# Patient Record
Sex: MALE | Race: WHITE | Employment: UNEMPLOYED | URBAN - NONMETROPOLITAN AREA
[De-identification: names, ages, dates, MRNs, and addresses within clinical notes are randomized per-mention and may not be internally consistent; named-entity substitution may affect disease eponyms.]

---

## 2022-01-07 ENCOUNTER — HOSPITAL ENCOUNTER (INPATIENT)
Age: 37
LOS: 5 days | Discharge: HOME | DRG: 885 | End: 2022-01-12
Attending: EMERGENCY MEDICINE | Admitting: PSYCHIATRY & NEUROLOGY
Payer: MEDICAID

## 2022-01-07 DIAGNOSIS — R45.851 SUICIDAL IDEATION: Primary | ICD-10-CM

## 2022-01-07 DIAGNOSIS — F20.9 SCHIZOPHRENIA, UNSPECIFIED TYPE (HCC): ICD-10-CM

## 2022-01-07 PROBLEM — F29 PSYCHOSIS (HCC): Status: ACTIVE | Noted: 2022-01-07

## 2022-01-07 PROBLEM — F32.A DEPRESSION WITH SUICIDAL IDEATION: Status: ACTIVE | Noted: 2022-01-07

## 2022-01-07 LAB
ALBUMIN SERPL-MCNC: 5 G/DL (ref 3.5–5.2)
ALP BLD-CCNC: 56 U/L (ref 40–130)
ALT SERPL-CCNC: 6 U/L (ref 5–41)
AMPHETAMINE SCREEN, URINE: NEGATIVE
ANION GAP SERPL CALCULATED.3IONS-SCNC: 10 MMOL/L (ref 7–19)
AST SERPL-CCNC: 10 U/L (ref 5–40)
BARBITURATE SCREEN URINE: NEGATIVE
BASOPHILS ABSOLUTE: 0 K/UL (ref 0–0.2)
BASOPHILS RELATIVE PERCENT: 0.4 % (ref 0–1)
BENZODIAZEPINE SCREEN, URINE: NEGATIVE
BILIRUB SERPL-MCNC: <0.2 MG/DL (ref 0.2–1.2)
BUN BLDV-MCNC: 7 MG/DL (ref 6–20)
CALCIUM SERPL-MCNC: 9.6 MG/DL (ref 8.6–10)
CANNABINOID SCREEN URINE: NEGATIVE
CHLORIDE BLD-SCNC: 102 MMOL/L (ref 98–111)
CO2: 26 MMOL/L (ref 22–29)
COCAINE METABOLITE SCREEN URINE: NEGATIVE
CREAT SERPL-MCNC: 0.9 MG/DL (ref 0.5–1.2)
EOSINOPHILS ABSOLUTE: 0.2 K/UL (ref 0–0.6)
EOSINOPHILS RELATIVE PERCENT: 1.5 % (ref 0–5)
ETHANOL: <10 MG/DL (ref 0–0.08)
GFR AFRICAN AMERICAN: >59
GFR NON-AFRICAN AMERICAN: >60
GLUCOSE BLD-MCNC: 98 MG/DL (ref 74–109)
HCT VFR BLD CALC: 44 % (ref 42–52)
HEMOGLOBIN: 14.3 G/DL (ref 14–18)
IMMATURE GRANULOCYTES #: 0 K/UL
LYMPHOCYTES ABSOLUTE: 2.8 K/UL (ref 1.1–4.5)
LYMPHOCYTES RELATIVE PERCENT: 27.6 % (ref 20–40)
Lab: NORMAL
MCH RBC QN AUTO: 29.6 PG (ref 27–31)
MCHC RBC AUTO-ENTMCNC: 32.5 G/DL (ref 33–37)
MCV RBC AUTO: 91.1 FL (ref 80–94)
MONOCYTES ABSOLUTE: 0.5 K/UL (ref 0–0.9)
MONOCYTES RELATIVE PERCENT: 5.1 % (ref 0–10)
NEUTROPHILS ABSOLUTE: 6.5 K/UL (ref 1.5–7.5)
NEUTROPHILS RELATIVE PERCENT: 65.2 % (ref 50–65)
OPIATE SCREEN URINE: NEGATIVE
PDW BLD-RTO: 11.9 % (ref 11.5–14.5)
PLATELET # BLD: 302 K/UL (ref 130–400)
PMV BLD AUTO: 10.1 FL (ref 9.4–12.4)
POTASSIUM SERPL-SCNC: 3.7 MMOL/L (ref 3.5–5)
RBC # BLD: 4.83 M/UL (ref 4.7–6.1)
SARS-COV-2, NAAT: NOT DETECTED
SODIUM BLD-SCNC: 138 MMOL/L (ref 136–145)
TOTAL PROTEIN: 6.6 G/DL (ref 6.6–8.7)
WBC # BLD: 10 K/UL (ref 4.8–10.8)

## 2022-01-07 PROCEDURE — 85025 COMPLETE CBC W/AUTO DIFF WBC: CPT

## 2022-01-07 PROCEDURE — 99285 EMERGENCY DEPT VISIT HI MDM: CPT

## 2022-01-07 PROCEDURE — 6370000000 HC RX 637 (ALT 250 FOR IP): Performed by: PSYCHIATRY & NEUROLOGY

## 2022-01-07 PROCEDURE — 80307 DRUG TEST PRSMV CHEM ANLYZR: CPT

## 2022-01-07 PROCEDURE — 96372 THER/PROPH/DIAG INJ SC/IM: CPT

## 2022-01-07 PROCEDURE — 82077 ASSAY SPEC XCP UR&BREATH IA: CPT

## 2022-01-07 PROCEDURE — 6360000002 HC RX W HCPCS: Performed by: EMERGENCY MEDICINE

## 2022-01-07 PROCEDURE — 80053 COMPREHEN METABOLIC PANEL: CPT

## 2022-01-07 PROCEDURE — 99223 1ST HOSP IP/OBS HIGH 75: CPT | Performed by: PSYCHIATRY & NEUROLOGY

## 2022-01-07 PROCEDURE — 87635 SARS-COV-2 COVID-19 AMP PRB: CPT

## 2022-01-07 PROCEDURE — 1240000000 HC EMOTIONAL WELLNESS R&B

## 2022-01-07 PROCEDURE — 36415 COLL VENOUS BLD VENIPUNCTURE: CPT

## 2022-01-07 RX ORDER — NICOTINE 21 MG/24HR
1 PATCH, TRANSDERMAL 24 HOURS TRANSDERMAL DAILY
Status: DISCONTINUED | OUTPATIENT
Start: 2022-01-07 | End: 2022-01-12 | Stop reason: HOSPADM

## 2022-01-07 RX ORDER — ZIPRASIDONE MESYLATE 20 MG/ML
20 INJECTION, POWDER, LYOPHILIZED, FOR SOLUTION INTRAMUSCULAR ONCE
Status: COMPLETED | OUTPATIENT
Start: 2022-01-07 | End: 2022-01-07

## 2022-01-07 RX ORDER — HYDROXYZINE HYDROCHLORIDE 25 MG/1
25 TABLET, FILM COATED ORAL 3 TIMES DAILY PRN
Status: DISCONTINUED | OUTPATIENT
Start: 2022-01-07 | End: 2022-01-12 | Stop reason: HOSPADM

## 2022-01-07 RX ORDER — TRAZODONE HYDROCHLORIDE 50 MG/1
50 TABLET ORAL NIGHTLY
Status: DISCONTINUED | OUTPATIENT
Start: 2022-01-07 | End: 2022-01-12 | Stop reason: HOSPADM

## 2022-01-07 RX ORDER — RISPERIDONE 1 MG/1
1 TABLET, FILM COATED ORAL NIGHTLY
Status: DISCONTINUED | OUTPATIENT
Start: 2022-01-07 | End: 2022-01-08

## 2022-01-07 RX ADMIN — ZIPRASIDONE MESYLATE 20 MG: 20 INJECTION, POWDER, LYOPHILIZED, FOR SOLUTION INTRAMUSCULAR at 02:34

## 2022-01-07 RX ADMIN — HYDROXYZINE HYDROCHLORIDE 25 MG: 25 TABLET ORAL at 14:22

## 2022-01-07 RX ADMIN — TRAZODONE HYDROCHLORIDE 50 MG: 50 TABLET ORAL at 20:28

## 2022-01-07 RX ADMIN — HYDROXYZINE HYDROCHLORIDE 25 MG: 25 TABLET ORAL at 20:28

## 2022-01-07 RX ADMIN — RISPERIDONE 1 MG: 1 TABLET ORAL at 20:28

## 2022-01-07 ASSESSMENT — SLEEP AND FATIGUE QUESTIONNAIRES
DO YOU USE A SLEEP AID: NO
DIFFICULTY FALLING ASLEEP: YES
AVERAGE NUMBER OF SLEEP HOURS: 2
DO YOU HAVE DIFFICULTY SLEEPING: YES
RESTFUL SLEEP: NO
DIFFICULTY STAYING ASLEEP: YES
DIFFICULTY ARISING: NO
SLEEP PATTERN: INSOMNIA

## 2022-01-07 ASSESSMENT — LIFESTYLE VARIABLES: HISTORY_ALCOHOL_USE: NO

## 2022-01-07 ASSESSMENT — ENCOUNTER SYMPTOMS
ABDOMINAL PAIN: 0
SHORTNESS OF BREATH: 0

## 2022-01-07 ASSESSMENT — PATIENT HEALTH QUESTIONNAIRE - PHQ9: SUM OF ALL RESPONSES TO PHQ QUESTIONS 1-9: 21

## 2022-01-07 NOTE — ED NOTES
Bed: 05  Expected date:   Expected time:   Means of arrival:   Comments:  EMS     Sindy Adam RN  01/07/22 0021

## 2022-01-07 NOTE — PROGRESS NOTES
Requirement Note     SW met with pt to complete Psychosocial and CSSR-S on this date. Patients long and short term goals discussed. Patient voiced understanding. Treatment plan sheet signed. Patient verbalized understanding of the treatment plan. Patient participated in goals and objectives of the treatment plan. Patient completed safety plan with , patient received copy of plan, and original was placed into patient's chart. SW explained treatment goals with pt. Decreasing depression and anxiety by improvement of positive coping patterns was discussed. Pt acknowledged understanding of treatment goals and signed treatment plan signature sheet. In the last 6 months has the pt been a danger to self: YES  In the last 6 months has the pt been a danger to others: NO  Legal Guardian/POA: NO     Provided patient with [employer] handout entitled \"Quitting Smoking. \"  Reviewed handout with patient: addressing dangers of smoking, developing coping skills, and providing basic information about quitting. Patient received all components practical counseling of tobacco practical counseling during the hospital stay.

## 2022-01-07 NOTE — H&P
Department of Psychiatry  Attending History and Physical        CHIEF COMPLAINT:  \"Hearing voices\"    History obtained from: patient, chart    HISTORY OF PRESENT ILLNESS:    29 y.o. white male from a care home house who presented to the emergency department for evaluation of depression and anxiety. He has been having  thoughts about harming himself. UDS negative. Patient seen resting in bed this morning. He is calm and cooperative. He reports suicidal ideation. Reports hearing voices for a number of years - \"not sure what they are saying, they come and go\". States he was diagnosed with schizophrenia several years ago. He was taking Risperdal which was helping and ran out about 4 months ago. States he did not have money for the medication. He was released from half-way about 6 months ago and has been staying in a care home house. States recently he has been working and saving money. He has no social support. He stopped communicating with his relatives a while ago. He is thinking about reaching out to his sister. He denies mood swings and racing thoughts. He denies decreased need for sleep. He denies paranoia. States he was somewhat paranoid when she was at home. He is open to medication adjustment. PSYCHIATRIC HISTORY:    Diagnoses: Schizophrenia ? Suicide attempts/gestures: Denies   Prior hospitalizations: about 1 yr ago   Medication trials: Risperdal, Celexa  Mental health contact: Lost to follow-up   Head trauma: Denies    SUBSTANCE USE HISTORY:  In the past abused alcohol and meth. No recent substance use. Smokes cigarettes. Past Medical History:    Past Medical History:   Diagnosis Date    Schizophrenia [facility])        Past Surgical History:    Past Surgical History:   Procedure Laterality Date    HAND SURGERY Left        Medications Prior to Admission:   Prior to Admission medications    Not on File       Allergies:  Patient has no known allergies. Social History:  Lives in a care home house.  Released from care home 6 mo ago. Family History:   No history of psychiatric illness or suicide attempts. REVIEW OF SYSTEMS:  General: No fevers, chills, night sweats, no recent weight loss or gain. Head: No headache, no migraine. Eyes: No recent visual changes. Ears: No recent hearing changes. Nose: No increased congestion or change in sense of smell. Throat: No sore throat, no trouble swallowing. Cardiovascular: No chest pain or palpitations, or dizziness. Respiratory: No cough, wheezes, congestion, or shortness of breath. Gastrointestinal: No abdominal pain, nausea or vomiting, no diarrhea or constipation. Musculo-skeletal: No edema, deformities, or loss of functions. Neurological: No loss of consciousness, abnormal sensations, or weakness. Skin: No rash, abrasions or bruises. PHYSICAL EXAM:  GENERAL APPEARANCE: 29y.o. year-old male in NAD   HEAD: Normocephalic, atraumatic. THROAT: No erythema, exudates, lesions. No tongue laceration. CARDIOVASCULAR: PMI nondisplaced. Regular rhythm and rate. Normal S1 and S2.  PULMONARY: Clear to auscultation bilaterally, no tenderness to palpation. ABDOMEN: Soft, nontender, nondistended. MUSCULOSKELTAL: No obvious deformities, clubbing, cyanosis or edema, no spinous process or paraspinous tenderness, normal ROM, distal pulses intact symmetric 2+ bilaterally. NEUROLOGICAL: Alert, oriented x 3, CN II-XII grossly intact, motor strength 5/5 all muscle groups, DTR 2+ intact and symmetric, sensation intact to sharp and dull. No abnormal movements or tremors. SKIN: Warm, dry, intact, no rash, abrasions bruises     Vitals:  /78   Pulse 100   Temp 97.2 °F (36.2 °C) (Temporal)   Resp 17   Ht 5' 5\" (1.651 m)   Wt 126 lb 9.6 oz (57.4 kg)   SpO2 96%   BMI 21.07 kg/m²     Mental Status Examination:    Appearance: Stated age. Gait stable. No abnormal movements or tremor. Behavior: Calm, cooperative. Speech: Normal in tone, volume, and quality.  No slurring, dysarthria or pressured speech noted. Mood: \"Low \"   Affect: Mood congruent. Thought Process: Appears linear. Thought Content: Endorses SI. Denies HI. Mild paranoia. Perceptions: Denies auditory or visual hallucinations at present time. Not responding to internal stimuli. Concentration: Intact. Orientation: to person, place, date, and situation. Language: Intact. Fund of information: Intact. Memory: Recent and remote appear intact. Neurovegitative: Poor appetite and sleep. Insight: Impaired. Judgment: Impaired. DATA:  Lab Results   Component Value Date    WBC 10.0 01/07/2022    HGB 14.3 01/07/2022    HCT 44.0 01/07/2022    MCV 91.1 01/07/2022     01/07/2022     Lab Results   Component Value Date     01/07/2022    K 3.7 01/07/2022     01/07/2022    CO2 26 01/07/2022    BUN 7 01/07/2022    CREATININE 0.9 01/07/2022    GLUCOSE 98 01/07/2022    CALCIUM 9.6 01/07/2022    PROT 6.6 01/07/2022    LABALBU 5.0 01/07/2022    BILITOT <0.2 01/07/2022    ALKPHOS 56 01/07/2022    AST 10 01/07/2022    ALT 6 01/07/2022    LABGLOM >60 01/07/2022    GFRAA >59 01/07/2022           ASSESSMENT AND PLAN:  DSM-5 DIAGNOSIS:   Impression:  Psychosis unspecified  R/o Schizoaffective disorder  Suicidal ideation  Tobacco use disorder    Patient endorses suicidal ideation and is meeting the criteria for inpatient psychiatric treatment. Plan:   -Admit to HI Unit and monitor on 15 minute checks. Suicide precautions.  Jason Owusu reviewed. -Gather collateral information from family with release.  -Medical monitoring to be performed by Dr. Jason Nielson and associates. Order routine labs. -Acclimate to the unit. Provide supportive psychotherapy.  -Encourage participation in groups and therapeutic activities as appropriate. Work on coping skills. -Medications:    Restart Risperdal for psychosis. Trazodone for sleep.  Discussed benefits, alternatives and risks involved with Trazodone, including - but not limited to - possible adverse effects of dizziness, hypotension, increased risk of falls w/ need to slowly transition between positions, excessive drowsiness, dry mouth, constipation or allergic reaction were discussed with the patient. Also discussed increased risk of priapism which is a painful, prolonged erection which constitutes a medical emergency for which the patient would need to notify provider while in the hospital or go to the nearest emergency room for treatment. Further discussed possibility of Serotonin Syndrome (sx including diaphoresis, agitation, muscle tension increase/rigidity, fever) with use of other serotonergic agents. Advised caution in operating vehicles/machinery after taking trazodone if continued as an outpatient.    Offer nicotine patch to help with nicotine withdrawal.    -The risks, benefits, side effects, indications, contraindications, and adverse effects of the medications have been discussed.  -The patient has verbalized understanding and has capacity to give informed consent.  -SW help evaluate home environment and provide outpatient resources.  -Discuss with treatment team.

## 2022-01-07 NOTE — PROGRESS NOTES
Group Therapy Note    Start Time: 900  End Time:  930  Number of Participants: 9    Type of Group: Community Meeting       Patient's Goal:        Notes:  Patient didn't attend    Participation Level:  Active Listener       Participation Quality: Appropriate      Thought Process/Content: Logical      Affective Functioning: Congruent      Mood: calm      Level of consciousness:  Alert      Modes of Intervention: Support      Discipline Responsible: Behavioral Health Tech II      Signature:  Alba Dhaliwal

## 2022-01-07 NOTE — ED NOTES
[facility] called informed that pt was going to be admitted     [id number]  01/07/22 2604

## 2022-01-07 NOTE — PLAN OF CARE
Group Therapy Note     Date: 1/7/2022  Start Time: 1100  End Time:  5465  Number of Participants: 6     Type of Group: Psychoeducation     Wellness Binder Information  Module Name:  Staying well  Session Number:  1     Patient's Goal:  daily maintenance and coping skills     Notes:  pt was verbally prompted to attend group. Pt refused. Information about staying well was provided. Status After Intervention:       Participation Level:      Participation Quality:         Speech:           Thought Process/Content:         Affective Functioning:         Mood:         Level of consciousness:          Response to Learning:         Endings:      Modes of Intervention:         Discipline Responsible: Psychoeducational Specialist        Signature:  Deirdre Agrawal

## 2022-01-07 NOTE — PLAN OF CARE
Problem: Discharge Planning:  Goal: Discharged to appropriate level of care  Description: Discharged to appropriate level of care  1/7/2022 1409 by Jennifer Fisher RN  Outcome: Ongoing  1/7/2022 1409 by Jennifer Fisher RN  Outcome: Ongoing     Problem: Health Maintenance - Impaired:  Goal: Ability to perform activities of daily living will improve  Description: Ability to perform activities of daily living will improve  1/7/2022 1409 by Jennifer Fisher RN  Outcome: Ongoing  1/7/2022 1409 by Jennifer Fisher RN  Outcome: Ongoing  Goal: Able to sleep without medication for appropriate length of time  Description: Able to sleep without medication for appropriate length of time  1/7/2022 1409 by Jennifer Fisher RN  Outcome: Ongoing  1/7/2022 1409 by Jennifer Fisher RN  Outcome: Ongoing  Goal: Maintenance of adequate nutrition will improve  Description: Maintenance of adequate nutrition will improve  1/7/2022 1409 by Jennifer Fisher RN  Outcome: Ongoing  1/7/2022 1409 by Jennifer Fisher RN  Outcome: Ongoing     Problem: Mood - Altered:  Goal: Mood stable  Description: Mood stable  1/7/2022 1409 by Jennifer Fisher RN  Outcome: Ongoing  1/7/2022 1409 by Jennifer Fisher RN  Outcome: Ongoing     Problem: Self-Esteem - Low:  Goal: Demonstrates positive self-esteem  Description: Demonstrates positive self-esteem  1/7/2022 1409 by Jennifer Fisher RN  Outcome: Ongoing  1/7/2022 1409 by Jennifer Fisher RN  Outcome: Ongoing     Problem: Cerebrospinal Fluid Leakage - Risk Of:  Goal: Demonstration of organized thought processes  Description: Demonstration of organized thought processes  1/7/2022 1409 by Jennifer Fisher RN  Outcome: Ongoing  1/7/2022 1409 by Jennifer Fisher RN  Outcome: Ongoing     Problem: Violence - Risk of, Self/Other-Directed:  Goal: Knowledge of developmental care interventions  Description: Absence of violence  1/7/2022 1409 by Jennifer Fisher RN  Outcome: Ongoing  1/7/2022 1409 by Francine Goldstein Willam RN  Outcome: Ongoing

## 2022-01-07 NOTE — ED NOTES
Pt changed into paper scrubs. Belongings removed from pt's room. Belongings checked by security. Sitter at bedside.       Rhonda French RN  01/07/22 7805

## 2022-01-07 NOTE — ED PROVIDER NOTES
[facility]  eMERGENCY dEPARTMENT eNCOUnter      Pt Name: Moraima Desai  MRN: [id number]  [place] 1985  Date of evaluation: 1/7/2022  Provider: Karla Mulligan MD    CHIEF COMPLAINT       Chief Complaint   Patient presents with    Insomnia     has not slept in 2 weeks. SI x2 weeks. no plan. depression/anxiety.  Suicidal         HISTORY OF PRESENT ILLNESS   (Location/Symptom, Timing/Onset,Context/Setting, Quality, Duration, Modifying Factors, Severity)  Note limiting factors. Moraima Desai is a 29 y.o. male who presents to the emergency department for evaluation regarding feelings of depression and anxiety. Patient states that he has a prior history of schizophrenia and feels like his thoughts are racing. He states he has been having some thoughts about wanting to harm himself. Patient states that he has a prior history of inpatient hospitalization most recently was about a year ago. HPI    NursingNotes were reviewed. REVIEW OF SYSTEMS    (2-9 systems for level 4, 10 or more for level 5)     Review of Systems   Constitutional: Negative for chills and fever. Respiratory: Negative for shortness of breath. Cardiovascular: Negative for chest pain. Gastrointestinal: Negative for abdominal pain. Neurological: Negative for syncope. Psychiatric/Behavioral: Positive for hallucinations, sleep disturbance and suicidal ideas. The patient is nervous/anxious. All other systems reviewed and are negative. PAST MEDICALHISTORY     Past Medical History:   Diagnosis Date    Schizophrenia [facility])          SURGICAL HISTORY       Past Surgical History:   Procedure Laterality Date    HAND SURGERY Left          CURRENT MEDICATIONS     Previous Medications    No medications on file       ALLERGIES     Patient has no known allergies. FAMILY HISTORY     History reviewed. No pertinent family history.        SOCIAL HISTORY       Social History     Socioeconomic History    Marital status: Legally      Spouse name: None    Number of children: None    Years of education: None    Highest education level: None   Occupational History    None   Tobacco Use    Smoking status: Current Every Day Smoker    Smokeless tobacco: Never Used   Substance and Sexual Activity    Alcohol use: Not Currently    Drug use: Not Currently     Types: Methamphetamines (Crystal Meth), Cocaine, Marijuana (Blenda De Luna)    Sexual activity: None   Other Topics Concern    None   Social History Narrative    None     Social Determinants of Health     Financial Resource Strain:     Difficulty of Paying Living Expenses: Not on file   Food Insecurity:     Worried About Running Out of Food in the Last Year: Not on file    Nirmala of Food in the Last Year: Not on file   Transportation Needs:     Lack of Transportation (Medical): Not on file    Lack of Transportation (Non-Medical):  Not on file   Physical Activity:     Days of Exercise per Week: Not on file    Minutes of Exercise per Session: Not on file   Stress:     Feeling of Stress : Not on file   Social Connections:     Frequency of Communication with Friends and Family: Not on file    Frequency of Social Gatherings with Friends and Family: Not on file    Attends Hinduism Services: Not on file    Active Member of [address] [place] [address] [employer] or Organizations: Not on file    Attends Club or Organization Meetings: Not on file    Marital Status: Not on file   Intimate Partner Violence:     Fear of Current or Ex-Partner: Not on file    Emotionally Abused: Not on file    Physically Abused: Not on file    Sexually Abused: Not on file   Housing Stability:     Unable to Pay for Housing in the Last Year: Not on file    Number of [place] in the Last Year: Not on file    Unstable Housing in the Last Year: Not on file       SCREENINGS             PHYSICAL EXAM    (up to 7 for level 4, 8 or more for level 5)     ED Triage Vitals [01/07/22 0024]   BP Temp Temp src Pulse Resp SpO2 Height Weight   139/86 98.8 °F (37.1 °C) -- 98 20 99 % 5' 5\" (1.651 m) 134 lb (60.8 kg)       Physical Exam  Vitals and nursing note reviewed. HENT:      Head: Atraumatic. Mouth/Throat:      Mouth: Mucous membranes are not dry. Eyes:      General: No scleral icterus. Pupils: Pupils are equal, round, and reactive to light. Neck:      Trachea: No tracheal deviation. Cardiovascular:      Rate and Rhythm: Normal rate and regular rhythm. Heart sounds: Normal heart sounds. No murmur heard. Pulmonary:      Effort: Pulmonary effort is normal. No respiratory distress. Breath sounds: Normal breath sounds. No stridor. Abdominal:      General: There is no distension. Palpations: Abdomen is soft. Tenderness: There is no abdominal tenderness. There is no guarding. Skin:     Capillary Refill: Capillary refill takes less than 2 seconds. Coloration: Skin is not pale. Findings: No rash. Neurological:      Mental Status: He is alert and oriented to person, place, and time. Psychiatric:         Mood and Affect: Mood is anxious. Behavior: Behavior is not agitated. Behavior is cooperative. Thought Content: Thought content includes suicidal ideation. DIAGNOSTIC RESULTS       LABS:  Labs Reviewed   CBC WITH AUTO DIFFERENTIAL - Abnormal; Notable for the following components:       Result Value    MCHC 32.5 (*)     Neutrophils % 65.2 (*)     All other components within normal limits   COVID-19, RAPID   COMPREHENSIVE METABOLIC PANEL   URINE DRUG SCREEN   ETHANOL       All other labs were within normal range or not returned as of this dictation. EMERGENCY DEPARTMENT COURSE and DIFFERENTIAL DIAGNOSIS/MDM:   Vitals:    Vitals:    01/07/22 0024   BP: 139/86   Pulse: 98   Resp: 20   Temp: 98.8 °F (37.1 °C)   SpO2: 99%   Weight: 134 lb (60.8 kg)   Height: 5' 5\" (1.651 m)       MDM    Reassessment    Patient is medically cleared to undergo psychiatric evaluation. There is no behavioral health intake team on duty for assessment this evening. We will speak directly with the attending psychiatrist and review this case. CONSULTS:    Case was discussed with Dr. Humphrey Crigler, attending psychiatrist who is agreeable to accept patient for inpatient admission to the adult BHI unit. PROCEDURES:  Unless otherwise noted below, none     Procedures    FINAL IMPRESSION      1. Suicidal ideation    2.  Schizophrenia, unspecified type [facility])          DISPOSITION/PLAN   DISPOSITION admitted to behavioral health unit      (Please note that portions of this note were completed with a voice recognition program.  Efforts were made to edit thedictations but occasionally words are mis-transcribed.)    Rosetta Jamison MD (electronically signed)  Attending Emergency Physician         Rosetta Jamison MD  01/07/22 5847

## 2022-01-07 NOTE — PROGRESS NOTES
Behavioral Services  Medicare Certification Upon Admission    I certify that this patient's inpatient psychiatric hospital admission is medically necessary for:    [x] (1) Treatment which could reasonably be expected to improve this patient's condition,       [] (2) Or for diagnostic study;     AND     [x](2) The inpatient psychiatric services are provided while the individual is under the care of a physician and are included in the individualized plan of care.     Estimated length of stay/service 3-5 days  Plan for post-hospital care TBA    Electronically signed by Angella Joshua MD on 1/7/2022 at 1:37 PM

## 2022-01-07 NOTE — ED NOTES
Called report to [facility]. She is going to call me back when the pt gets a bed assignment.       Ammy Son, RN  01/07/22 3076

## 2022-01-07 NOTE — PROGRESS NOTES
BHI Daily Shift Assessment  Nursing Progress Note    Room: 0608/608-01 Name: Eyad Elder Age: 29 y.o. Gender: male   Dx: <principal problem not specified>  Precautions: suicide risk  Target Symptoms:   Accu-Chek: NoSleep: Yes,Sleep Quality Good SI No [facility] auditory [facility]al [facility] [employer]  ADLs: Yes Speech: normal Depression: 6 Anxiety: 10   Participation LevelActive Listener and Interactive  Appetite: Good  Respiratory symptoms: No Headache: No Body aches: No Fever: No Cough: No  Patients encouraged to wear masks, wash hands frequently and practice social distancing while on the unit: Yes  Visitation: No   Participation QualityAppropriate and Attentive    Complaints:    Notes:  Patient rested in his room after being medicated down in the ER. Patient woke up and ate his lunch and took a shower. Talked with patient during this time. Patient said that because of the medicine in the ED he got really good sleep and claimed to have gotten at least 8 hours. Patient said that his depression rated as a 6 and his anxiety rated at a 9 or a 10. Patient asked for some anxiety medicine at this time. Patient denied SI and HI but said that he is constantly having auditory hallucinations that do give him commands. In the past he has gotten commands to harm himself or others, but today the commands consisted of \"Put your left shoe on first.\" and \"Hold your hand this way when you throw away trash. \" Will continue to monitor patient for safety.      Signature:

## 2022-01-07 NOTE — PROGRESS NOTES
Admission Note      Reason for admission/Target Symptom: Patient admitted to [facility] due to male who presents to the emergency department for evaluation regarding feelings of depression and anxiety. Patient states that he has a prior history of schizophrenia and feels like his thoughts are racing. He states he has been having some thoughts about wanting to harm himself. Patient states that he has a prior history of inpatient hospitalization most recently was about a year ago.     Diagnoses: Depression NOS  UDS: Neg  BAL:  Neg    SW met with treatment team to discuss patient's treatment including care planning, discharge planning, and follow-up needs. Pt has been admitted to [facility]. Treatment team has identified patient's discharge needs as medication management and outpatient therapy/counseling. Pt confirmed  the need for ongoing treatment post inpatient stay. Pt was also provided a handout of contact information for drug and alcohol treatment centers and other community support service such as [facility], AA, and [facility].

## 2022-01-07 NOTE — FLOWSHEET NOTE
01/07/22 1543   Encounter Summary   Services provided to: Patient   Referral/Consult From: Physician  (Consult:  Emotional distress)   Complexity of Encounter High   Length of Encounter 1 hour   Spiritual/Tenriism   Type Spiritual struggle   Assessment Tearful;Fearful   Intervention Active listening;Discussed belief system/Protestant practices/magui;Empowerment   Outcome Venting emotion; De-escalated   S:  Patient stated \"Am scared; that I can be that person capable,\" of doing harm. Mentioned family without elaborating. O:  Patient appeared tearful, paused long silence before talking; appeared to be in deep thought before saying. A:  Patient seems able to articulate for the first time his \"reality\" and the sense of being responsible for the bad things in the world; heightened sense of guilt and helplessness; towards the end, seems able to sense that he is not alone in his condition; a relief to put some words to his Kiki Saldana" thoughts. Seems to appreciate his decision to come to the facility & owning he would benefit more in following the program.    P:  Spiritual Care: attentive listening; challenging.     Electronically signed by TREVOR Roque. [employer] on 1/7/2022 at 3:57 PM  P:

## 2022-01-07 NOTE — PROGRESS NOTES
[facility] Admission from ED  Nursing Admission Note        Reason for Admission: Brian Romero is a 29 y.o. male who presents to the emergency department for evaluation regarding feelings of depression and anxiety. Patient states that he has a prior history of schizophrenia and feels like his thoughts are racing. He states he has been having some thoughts about wanting to harm himself. Patient states that he has a prior history of inpatient hospitalization most recently was about a year ago    Patient Active Problem List   Diagnosis    Depression with suicidal ideation         Addictive Behavior:   Addictive Behavior  In the past 3 months, have you felt or has someone told you that you have a problem with:  : None  Do you have a history of Chemical Use?: No  Do you have a history of Alcohol Use?: No  Do you have a history of Street Drug Abuse?: No  Histroy of Prescripton Drug Abuse?: No    Medical Problems:   Past Medical History:   Diagnosis Date    Schizophrenia ([facility] [address]        Status EXAM:  Status and Exam  Normal: No  Facial Expression: Sad,Flat,Worried  Affect: Congruent  Level of Consciousness: Alert  Mood:Normal: No  Mood: Depressed,Anxious,Sad,Helpless  Motor Activity:Normal: No  Motor Activity: Decreased  Interview Behavior: Cooperative  Preception: [place] to Person,[place] to Time,[place] to Place,[place] to Situation  Attention:Normal: No  Attention: Unable to Concentrate,Distractible  Thought Processes: Circumstantial,Blocking  Thought Content:Normal: No  Thought Content: Preoccupations  Hallucinations:  Auditory (Comment) (Patient stated the voices tell him to kill himself.)  Delusions: No  Memory:Normal: No  Memory: Poor Recent,Poor Remote  Insight and Judgment: No  Insight and Judgment: Poor Judgment,Poor Insight  Present Suicidal Ideation: No  Present Homicidal Ideation: No      Metabolic Screening:    No results found for: LABA1C  No results found for: CHOL  No results found for: TRIG  No results found for: HDL  No components found for: LDLCAL  No results found for: LABVLDL    Body mass index is 21.07 kg/m². BP Readings from Last 2 Encounters:   01/07/22 130/78       PATIENT STRENGTHS:  Strengths: Communication    Patient Strengths and Limitations:  Limitations: Lacks leisure interests,Tendency to isolate self,Hopeless about future      Tobacco Screening:  Practical Counseling, on admission, krish X, if applicable and completed (first 3 are required if patient doesn't refuse):            Recognizing danger situations (included triggers and roadblocks)   yes              Coping skills (new ways to manage stress, exercise, relaxation techniques, changing routine, distraction  yes                                                   Basic information about quitting (benefits of quitting, techniques in how to quit, available resources yes  Referral for counseling faxed to [facility]                                     Patient refused counseling yes  Patient has not smoked in the last 30 days no  Patient offered nicotine patch. No Received  Refused n/a  Patient is a non-smoker no         Admission to Unit:    Pt admitted to [facility] under the care of Dr. Thao Kincaid,  arrived on unit via [facility] [address] with security and staff from ED   Patient arrived dressed in paper scrubs:  yes. Body assessment and safety check completed by [facility] and [facility] and  no contraband discovered. Patient belongings and valuables was cataloged and accounted for by [facility]. Admission completed by Sendy Enciso to unit, unit policy and expectations:  yes    Reviewed and explained all legal documents:  yes    Education for Fall Prevention and Restraints given: yes    Patient signed all legal documents yes   Pt verbalizes understanding:yes     Jennifer Best Obtained? yes    Identifies stressors.yes   .     COVID TEACHING: Nursing provided education regarding COVID for social distancing, wearing masks, washing hands, and reporting any symptoms:

## 2022-01-08 LAB
CHOLESTEROL, TOTAL: 153 MG/DL (ref 160–199)
HBA1C MFR BLD: 5.2 % (ref 4–6)
HDLC SERPL-MCNC: 46 MG/DL (ref 55–121)
LDL CHOLESTEROL CALCULATED: 89 MG/DL
TRIGL SERPL-MCNC: 91 MG/DL (ref 0–149)
TSH REFLEX FT4: 0.58 UIU/ML (ref 0.35–5.5)
VITAMIN B-12: 168 PG/ML (ref 211–946)
VITAMIN D 25-HYDROXY: 18.8 NG/ML

## 2022-01-08 PROCEDURE — 99233 SBSQ HOSP IP/OBS HIGH 50: CPT | Performed by: PSYCHIATRY & NEUROLOGY

## 2022-01-08 PROCEDURE — 84443 ASSAY THYROID STIM HORMONE: CPT

## 2022-01-08 PROCEDURE — 80061 LIPID PANEL: CPT

## 2022-01-08 PROCEDURE — 82306 VITAMIN D 25 HYDROXY: CPT

## 2022-01-08 PROCEDURE — 1240000000 HC EMOTIONAL WELLNESS R&B

## 2022-01-08 PROCEDURE — 6360000002 HC RX W HCPCS: Performed by: FAMILY MEDICINE

## 2022-01-08 PROCEDURE — 82607 VITAMIN B-12: CPT

## 2022-01-08 PROCEDURE — 83036 HEMOGLOBIN GLYCOSYLATED A1C: CPT

## 2022-01-08 PROCEDURE — 6370000000 HC RX 637 (ALT 250 FOR IP): Performed by: PSYCHIATRY & NEUROLOGY

## 2022-01-08 PROCEDURE — 36415 COLL VENOUS BLD VENIPUNCTURE: CPT

## 2022-01-08 PROCEDURE — 6370000000 HC RX 637 (ALT 250 FOR IP): Performed by: FAMILY MEDICINE

## 2022-01-08 RX ORDER — RISPERIDONE 1 MG/1
2 TABLET, FILM COATED ORAL NIGHTLY
Status: DISCONTINUED | OUTPATIENT
Start: 2022-01-08 | End: 2022-01-12 | Stop reason: HOSPADM

## 2022-01-08 RX ORDER — CYANOCOBALAMIN 1000 UG/ML
1000 INJECTION INTRAMUSCULAR; SUBCUTANEOUS WEEKLY
Status: DISCONTINUED | OUTPATIENT
Start: 2022-01-21 | End: 2022-01-12 | Stop reason: HOSPADM

## 2022-01-08 RX ORDER — CYANOCOBALAMIN 1000 UG/ML
1000 INJECTION INTRAMUSCULAR; SUBCUTANEOUS DAILY
Status: DISCONTINUED | OUTPATIENT
Start: 2022-01-08 | End: 2022-01-12 | Stop reason: HOSPADM

## 2022-01-08 RX ORDER — ERGOCALCIFEROL 1.25 MG/1
50000 CAPSULE ORAL WEEKLY
Status: DISCONTINUED | OUTPATIENT
Start: 2022-01-08 | End: 2022-01-12 | Stop reason: HOSPADM

## 2022-01-08 RX ORDER — CYANOCOBALAMIN 1000 UG/ML
1000 INJECTION INTRAMUSCULAR; SUBCUTANEOUS
Status: DISCONTINUED | OUTPATIENT
Start: 2022-03-11 | End: 2022-01-12 | Stop reason: HOSPADM

## 2022-01-08 RX ADMIN — HYDROXYZINE HYDROCHLORIDE 25 MG: 25 TABLET ORAL at 15:51

## 2022-01-08 RX ADMIN — TRAZODONE HYDROCHLORIDE 50 MG: 50 TABLET ORAL at 20:56

## 2022-01-08 RX ADMIN — ERGOCALCIFEROL 50000 UNITS: 1.25 CAPSULE ORAL at 14:17

## 2022-01-08 RX ADMIN — RISPERIDONE 2 MG: 1 TABLET ORAL at 20:56

## 2022-01-08 RX ADMIN — CYANOCOBALAMIN 1000 MCG: 1000 INJECTION, SOLUTION INTRAMUSCULAR at 14:18

## 2022-01-08 NOTE — PROGRESS NOTES
Group Therapy Note    Start Time: 800  End Time:  474  Number of Participants: 10    Type of Group: Community Meeting       Patient's Goal:  \"My thoughts\"      Notes:        Participation Level:  Active Listener       Participation Quality: Appropriate      Thought Process/Content: Logical      Affective Functioning: Congruent      Mood: Calm      Level of consciousness:  Alert      Modes of Intervention: Support      Discipline Responsible: Behavioral Health Tech II      Signature:  Danae Menjivar

## 2022-01-08 NOTE — H&P
HISTORY and PHYSICAL      CHIEF COMPLAINT:  Psychosis    Reason for Admission:  Psychosis    History Obtained From:  Patient, chart    HISTORY OF PRESENT ILLNESS:      The patient is a 29 y.o. male who is admitted to the [place] [address] unit with worsening mood issues. He has no new medical issues. He has no c/o CP or SOA. No abdominal pain or N/V. No dysuria. No new pain complaints. No HA or dizziness. Past Medical History:        Diagnosis Date    Schizophrenia [facility])      Past Surgical History:        Procedure Laterality Date    HAND SURGERY Left          Medications Prior to Admission:    No medications prior to admission. Allergies:  Patient has no known allergies. Social History:   TOBACCO:   reports that he has been smoking. He has never used smokeless tobacco.  ETOH:   reports previous alcohol use. DRUGS:   reports previous drug use. Drugs: Methamphetamines (Crystal Meth), Cocaine, and Marijuana (Joanna Crumbly). MARITAL STATUS: single  OCCUPATION:  He is working  Patient currently lives in a residential house      Family History:   History reviewed. No pertinent family history. REVIEW OF SYSTEMS:  Constitutional: neg  CV: neg  Pulmonary: neg  GI: neg  : neg  Psych: psychosis  Neuro: neg  Skin: neg  MusculoSkeletal: neg  HEENT: neg  Joints: neg    Vitals:  /85   Pulse 85   Temp 97.5 °F (36.4 °C)   Resp 16   Ht 5' 5\" (1.651 m)   Wt 126 lb 9.6 oz (57.4 kg)   SpO2 99%   BMI 21.07 kg/m²     PHYSICAL EXAM:  Gen: NAD, alert  HEENT: WNL  Lymph: no LAD  Neck: no JVD or masses  Chest: CTA bilat  CV: RRR  Abdomen: NT/ND  Extrem: no C/C/E  Neuro: non focal  Skin: no rashes  Joints: no redness    DATA:  I have reviewed the admission labs and imaging tests.     ASSESSMENT AND PLAN:      Active Problems:    Psychosis---follow with Bryant Rangel MD  8:58 PM 1/7/2022

## 2022-01-08 NOTE — PROGRESS NOTES
Group Note    Number of Participants in Group: 8  Number of Patients on Unit:11      Patient attended group:Yes  Reason for Absence:  Intervention for patient absence:        Type of Group:   Wrap-Up/Relaxation    Patient's Goal: See wrap up group sheet    Participation Level:     Active participant           Patient Response to Learning: Yes    Patient's Behavior: Withdrawn    Is Patient Social/Interacting: Yes/ minimal    Relaxation:   Television:No   Reading:No   Game/Puzzle:No   Phone: No       Notes/Comments:      Please see patient's wrap up group sheet for patient's comments       Electronically signed by Terence Chowdhury on 1/8/22 at 3:02 AM CST

## 2022-01-08 NOTE — PLAN OF CARE
Problem: Discharge Planning:  Goal: Discharged to appropriate level of care  1/8/2022 0935 by Ivana Maria RN  Outcome: Ongoing  1/8/2022 0006 by Maximiliano Looney RN  Outcome: Ongoing     Problem: Health Maintenance - Impaired:  Goal: Ability to perform activities of daily living will improve  1/8/2022 0935 by Ivana Maria RN  Outcome: Ongoing  1/8/2022 0006 by Maximiliano Looney RN  Outcome: Ongoing  Goal: Able to sleep without medication for appropriate length of time  1/8/2022 0935 by Ivana Maria RN  Outcome: Ongoing  1/8/2022 0006 by Maximiliano Looney RN  Outcome: Ongoing  Goal: Maintenance of adequate nutrition will improve  1/8/2022 0935 by Ivana Maria RN  Outcome: Ongoing  1/8/2022 0006 by Maximiliano Looney RN  Outcome: Ongoing     Problem: Mood - Altered:  Goal: Mood stable  1/8/2022 0935 by Ivana Maria RN  Outcome: Ongoing  1/8/2022 0006 by Maximiliano Looney RN  Outcome: Ongoing     Problem: Self-Esteem - Low:  Goal: Demonstrates positive self-esteem  1/8/2022 0935 by Ivana Maria RN  Outcome: Ongoing  1/8/2022 0006 by Maximiliano Looney RN  Outcome: Ongoing     Problem: Cerebrospinal Fluid Leakage - Risk Of:  Goal: Demonstration of organized thought processes  1/8/2022 0935 by Ivana Maria RN  Outcome: Ongoing  1/8/2022 0006 by Maximiliano Looney RN  Outcome: Ongoing     Problem: Violence - Risk of, Self/Other-Directed:  Goal: Knowledge of developmental care interventions  1/8/2022 0935 by Ivana Maria RN  Outcome: Ongoing  1/8/2022 0006 by Maximiliano Looney RN  Outcome: Ongoing

## 2022-01-08 NOTE — PLAN OF CARE
Problem: Discharge Planning:  Goal: Discharged to appropriate level of care  Description: Discharged to appropriate level of care  1/8/2022 0006 by Raúl Trevizo RN  Outcome: Ongoing  1/7/2022 1409 by William Sweet RN  Outcome: Ongoing     Problem: Health Maintenance - Impaired:  Goal: Ability to perform activities of daily living will improve  Description: Ability to perform activities of daily living will improve  1/8/2022 0006 by Raúl Trevizo RN  Outcome: Ongoing  1/7/2022 1409 by William Sweet RN  Outcome: Ongoing  Goal: Able to sleep without medication for appropriate length of time  Description: Able to sleep without medication for appropriate length of time  1/8/2022 0006 by Raúl Trevizo RN  Outcome: Ongoing  1/7/2022 1409 by William Sweet RN  Outcome: Ongoing  Goal: Maintenance of adequate nutrition will improve  Description: Maintenance of adequate nutrition will improve  1/8/2022 0006 by Raúl Trevizo RN  Outcome: Ongoing  1/7/2022 1409 by William Sweet RN  Outcome: Ongoing     Problem: Mood - Altered:  Goal: Mood stable  Description: Mood stable  1/8/2022 0006 by Raúl Trevizo RN  Outcome: Ongoing  1/7/2022 1409 by William Sweet RN  Outcome: Ongoing     Problem: Self-Esteem - Low:  Goal: Demonstrates positive self-esteem  Description: Demonstrates positive self-esteem  1/8/2022 0006 by Raúl Trevizo RN  Outcome: Ongoing  1/7/2022 1409 by William Sweet RN  Outcome: Ongoing     Problem: Violence - Risk of, Self/Other-Directed:  Goal: Knowledge of developmental care interventions  Description: Absence of violence  1/8/2022 0006 by Raúl Trevizo RN  Outcome: Ongoing  1/7/2022 1409 by William Sweet RN  Outcome: Ongoing

## 2022-01-08 NOTE — PROGRESS NOTES
BHI Daily Shift Assessment  Nursing Progress Note    Room: 06/608-01 Name: Shilpa Aaron Age: 29 y.o. Gender: male   Dx: <principal problem not specified>  Precautions: suicide risk  Target Symptoms:   Accu-Chek: NoSleep: Yes,Sleep Quality Fair SI No AVH auditory [facility]  ADLs: No Speech: normal Depression: 7 Anxiety: 8   Participation LevelMinimal  Appetite: Fair  Respiratory symptoms: No Headache: No Body aches: No Fever: No Cough: No  Patients encouraged to wear masks, wash hands frequently and practice social distancing while on the unit: Yes  Visitation: No   Participation QualityResistant    Complaints:none    Notes: Patient is alert and oriented x 4. Pleasant, calm and cooperative. Sitting in day area isolated to self, wearing hospital attire, appearance is clean and appropriate. Well groomed. Withdrawn and guarded. Affect flat, thought processes are linear. Not social and did not attend morning group. Appetite and sleep is fair.      Signature: Electronically signed by Marek Pena RN on 1/8/22 at 10:28 AM CST

## 2022-01-08 NOTE — PROGRESS NOTES
[facility] Adult Unit Daily Assessment  Nursing Progress Note    Room: [facility]/608-01   Name: Debra Liz   Age: 29 y.o. Gender: male   Dx: <principal problem not specified>  Precautions: suicide risk  Inpatient Status: voluntary       SLEEP:    Sleep Quality Good  Sleep Medications: Yes   PRN Sleep Meds: No       MEDICAL:    Other PRN Meds: Yes   Med Compliant: Yes  Accu-Chek: No  Oxygen/CPAP/BiPAP: No  CIWA/CINA: No   PAIN Assessment: none  Side Effects from medication: No    Is Patient experiencing any respiratory symptoms (headache, fever, body aches, cough. Vonne Dach ): no  Patient educated by nursing to practice social distancing, wear masks, wash hands frequently: yes      PSYCH:    Depression: 6   Anxiety: 7   SI denies suicidal ideation   HI Negative for homicidal ideation      AVH:Present -  Command Hallucinations-non threatening      GENERAL:    Appetite: no change from normal    Social: Yes, minimal   Speech: hesitant   Appearance: appropriately dressed and healthy looking    GROUP:    Group Participation: Yes  Participation Quality: Active Listener    Notes:     Alert, oriented, pleasant and cooperative. Sitting quietly alone at table. Reports depression 6/10 and anxiety 7/10, denies SI or HI but does endorse AVH. Reports the commands are related to \"normal day to day activities. \" Denies any threatening commands. Compliant with medications, social at times, participates in groups, and cooperative with staff.      Electronically signed by Jose Luis Christianson RN on 1/8/22 at 2:55 AM CST

## 2022-01-08 NOTE — PROGRESS NOTES
Department of Psychiatry  Attending Progress Note     Chief complaint: \"I'm doing so-so\"    SUBJECTIVE:   Chart reviewed, discussed with the team.  Patient remains withdrawn. Sleeping and eating well. Med compliant. Patient seen in the dining area today. He is calm and cooperative. States he slept somewhat better. No significant improvement in his mood. Reports intermittent auditory hallucinations. Denies suicidal ideation. Asking about his medications. No complaints. OBJECTIVE    Physical  Wt Readings from Last 3 Encounters:   01/07/22 126 lb 9.6 oz (57.4 kg)     Temp Readings from Last 3 Encounters:   01/08/22 97.5 °F (36.4 °C) (Temporal)     BP Readings from Last 3 Encounters:   01/08/22 112/71     Pulse Readings from Last 3 Encounters:   01/08/22 91        Review of Systems: 14-point review of systems negative except as described above    Mental Status Examination:   Appearance:  Stated age. Microcephaly. Gait stable. No abnormal movements or tremor. Behavior: Calm, coperative  Speech: Normal in tone, volume, and quality. No slurring, dysarthria or pressured speech noted. Mood: \"So-so \"   Affect: Constricted  Thought Process: Appears linear. Thought Content: Denies SI/HI . No overt delusions or paranoia appreciated. Perceptions: Intermittent auditory hallucinations. Dennies visual hallucinations at present time. Not responding to internal stimuli. Concentration: Intact. Orientation: to person, place, date, and situation. Language: Intact. Fund of information: Intact. Memory: Recent and remote appear intact. Neurovegitative: Improved appetite and sleep. Insight: Improving. Judgment: Improving.     Data  Lab Results   Component Value Date    WBC 10.0 01/07/2022    HGB 14.3 01/07/2022    HCT 44.0 01/07/2022    MCV 91.1 01/07/2022     01/07/2022      Lab Results   Component Value Date     01/07/2022    K 3.7 01/07/2022     01/07/2022    CO2 26 01/07/2022 BUN 7 01/07/2022    CREATININE 0.9 01/07/2022    GLUCOSE 98 01/07/2022    CALCIUM 9.6 01/07/2022    PROT 6.6 01/07/2022    LABALBU 5.0 01/07/2022    BILITOT <0.2 01/07/2022    ALKPHOS 56 01/07/2022    AST 10 01/07/2022    ALT 6 01/07/2022    LABGLOM >60 01/07/2022    GFRAA >59 01/07/2022       Medications    Current Facility-Administered Medications:     vitamin D (ERGOCALCIFEROL) capsule 50,000 Units, 50,000 Units, Oral, Weekly, Artemio Macias MD    cyanocobalamin injection 1,000 mcg, 1,000 mcg, IntraMUSCular, Daily **FOLLOWED BY** [START ON 1/21/2022] cyanocobalamin injection 1,000 mcg, 1,000 mcg, IntraMUSCular, Weekly **FOLLOWED BY** [START ON 3/11/2022] cyanocobalamin injection 1,000 mcg, 1,000 mcg, IntraMUSCular, Q30 Days, Artemio Macias MD    nicotine (NICODERM CQ) 21 MG/24HR 1 patch, 1 patch, TransDERmal, Daily, Felipe Shanks MD, 1 patch at 01/08/22 1001    risperiDONE (RISPERDAL) tablet 1 mg, 1 mg, Oral, Nightly, Shaun Griffiths MD, 1 mg at 01/07/22 2028    traZODone (DESYREL) tablet 50 mg, 50 mg, Oral, Nightly, Shaun Griffiths MD, 50 mg at 01/07/22 2028    hydrOXYzine (ATARAX) tablet 25 mg, 25 mg, Oral, TID PRN, Shaun Griffiths MD, 25 mg at 01/07/22 2028    ASSESSMENT AND PLAN  DSM 5 DIAGNOSIS  Impression  Psychosis unspecified  R/o Schizoaffective disorder  Tobacco use disorder  Vit B12 deficiency, severe  Vit D deficiency    Continue to observe. Med adjustment. Plan:   1. Psychiatric Medications:   Increase Risperdal to help with psychosis. Monitor for side effects. The risks, benefits, side effects, indications, contraindications, alternatives and adverse effects of the medications have been discussed with patient. 2. Continue to provide supportive psychotherapy. Encourage socialization and participation in recreational activities. Work on coping skills. 3. Medical Issues:    Continue medical monitoring by Dr. Jerrell Hamilton and associates.       4. Disposition:     to provide outpatient resources and facilitate disposition.      Amount of time spent with patient:      35 minutes with greater than 50 % of the time spent in counseling and collaboration of care

## 2022-01-09 PROCEDURE — 6370000000 HC RX 637 (ALT 250 FOR IP): Performed by: PSYCHIATRY & NEUROLOGY

## 2022-01-09 PROCEDURE — 1240000000 HC EMOTIONAL WELLNESS R&B

## 2022-01-09 PROCEDURE — 6360000002 HC RX W HCPCS: Performed by: FAMILY MEDICINE

## 2022-01-09 RX ADMIN — TRAZODONE HYDROCHLORIDE 50 MG: 50 TABLET ORAL at 20:31

## 2022-01-09 RX ADMIN — RISPERIDONE 2 MG: 1 TABLET ORAL at 20:31

## 2022-01-09 RX ADMIN — CYANOCOBALAMIN 1000 MCG: 1000 INJECTION, SOLUTION INTRAMUSCULAR at 08:14

## 2022-01-09 NOTE — PROGRESS NOTES
Progress Note  Donnie Begum  1/9/2022 12:23 PM  Subjective:   Admit Date:   1/7/2022      CC/ADMIT DX:       Interval History:   Reviewed overnight events and nursing notes. He denies any physical complaints. I have reviewed all labs/diagnostics from the last 24hrs. ROS:   I have done a 10 point ROS and all are negative, except what is mentioned in the HPI. ADULT DIET; Regular    Medications:      vitamin D  50,000 Units Oral Weekly    cyanocobalamin  1,000 mcg IntraMUSCular Daily    Followed by   Stacie Vick ON 1/21/2022] cyanocobalamin  1,000 mcg IntraMUSCular Weekly    Followed by   Stacie Vick ON 3/11/2022] cyanocobalamin  1,000 mcg IntraMUSCular Q30 Days    risperiDONE  2 mg Oral Nightly    nicotine  1 patch TransDERmal Daily    traZODone  50 mg Oral Nightly           Objective:   Vitals: /71   Pulse 105   Temp 98.1 °F (36.7 °C) (Temporal)   Resp 16   Ht 5' 5\" (1.651 m)   Wt 126 lb 9.6 oz (57.4 kg)   SpO2 98%   BMI 21.07 kg/m²  No intake or output data in the 24 hours ending 01/09/22 1223  General appearance: alert and cooperative with exam  Extremities: extremities normal, atraumatic, no cyanosis or edema  Neurologic:  No obvious focal neurologic deficits. Skin: no rashes    Assessment and Plan: Active Problems:    Depression with suicidal ideation    Psychosis ([facility] [address]  Resolved Problems:    * No resolved hospital problems. *    Vit D and B12 Def    Elevated BP    Plan:  1. Continue present medication(s)   2. Follow with BP  3. Follow with Psych      Discharge planning:   home     Reviewed treatment plans with the patient and/or family.              Electronically signed by Rosa Scott MD on 1/9/2022 at 12:23 PM

## 2022-01-09 NOTE — PROGRESS NOTES
BHI Daily Shift Assessment  Nursing Progress Note     Room: 06/608-01 Name: Sammie Alamo Age: 29 y.o. Gender: male   Dx: <principal problem not specified>  Precautions: suicide risk  Target Symptoms:   Accu-Chek: NoSleep: Yes,Sleep Quality Fair SI No AVH auditory [address] [place] [address]  ADLs: No Speech: normal Depression: 5 Anxiety: 7  Participation LevelMinimal  Appetite: Fair  Respiratory symptoms: No Headache: No Body aches: No Fever: No Cough: No  Patients encouraged to wear masks, wash hands frequently and practice social distancing while on the unit: Yes  Visitation: No   Participation QualityResistant     Complaints:none     Notes: Patient is alert and oriented x 4. Pleasant, calm and cooperative. Sitting in day area isolated to self, mood is sad and worried. Wearing hospital attire, appearance is clean and appropriate. Well groomed. Withdrawn and guarded. Affect flat, thought processes are linear. Not social but  attended morning group. Appetite and sleep is fair. Present for auditory hallucinations. Says he is hearing multiple voices telling him \"everything\" but they are improving.      Electronically signed by Shivam Guevara RN on 1/9/22 at 9:39 AM CST

## 2022-01-09 NOTE — PLAN OF CARE
Problem: Discharge Planning:  Goal: Discharged to appropriate level of care  Outcome: Ongoing     Problem: Health Maintenance - Impaired:  Goal: Ability to perform activities of daily living will improve  Outcome: Ongoing  Goal: Able to sleep without medication for appropriate length of time  Outcome: Ongoing  Goal: Maintenance of adequate nutrition will improve  Outcome: Ongoing     Problem: Mood - Altered:  Goal: Mood stable  Outcome: Ongoing     Problem: Self-Esteem - Low:  Goal: Demonstrates positive self-esteem  Outcome: Ongoing     Problem: Cerebrospinal Fluid Leakage - Risk Of:  Goal: Demonstration of organized thought processes  Outcome: Ongoing     Problem: Violence - Risk of, Self/Other-Directed:  Goal: Knowledge of developmental care interventions  Outcome: Ongoing

## 2022-01-09 NOTE — PROGRESS NOTES
Group Therapy Note    Start Time: 800  End Time:  900  Number of Participants: 9    Type of Group: Community Meeting       Patient's Goal:  \"Mental health\"      Notes:        Participation Level:  Active Listener       Participation Quality: Appropriate      Thought Process/Content: Logical      Affective Functioning: Congruent      Mood: Calm      Level of consciousness:  Alert      Modes of Intervention: Support      Discipline Responsible: Behavioral Health Tech II      Signature:  Jennifer Ernandez

## 2022-01-09 NOTE — PROGRESS NOTES
Group Note    Number of Participants in Group: 11  Number of Patients on Unit:11      Patient attended group:No  Reason for Absence:  Intervention for patient absence:        Type of Group:   Wrap-Up/Relaxation    Patient's Goal: See wrap up group sheet    Participation Level:    none           Patient Response to Learning: No    Patient's Behavior: Withdrawn    Is Patient Social/Interacting: No    Relaxation:   Television:No   Reading:No   Game/Puzzle:Yes   Phone: No       Notes/Comments:      Please see patient's wrap up group sheet for patient's comments       Electronically signed by Eder Garcia on 1/8/22 at 8:40 PM CST

## 2022-01-09 NOTE — PROGRESS NOTES
[facility] Adult Unit Daily Assessment  Nursing Progress Note    Room: 06/608-01   Name: Valentin Brito   Age: 29 y.o. Gender: male   Dx: <principal problem not specified>  Precautions: suicide risk  Inpatient Status: voluntary       SLEEP:    Sleep Quality Good  Sleep Medications: Yes, Trazodone 50mg & received Risperdal 2mg at HS  PRN Sleep Meds: No       MEDICAL:    Other PRN Meds: No   Med Compliant: Yes  Accu-Chek: No  Oxygen/CPAP/BiPAP: No  CIWA/CINA: No   PAIN Assessment: none  Side Effects from medication: No    Is Patient experiencing any respiratory symptoms (headache, fever, body aches, cough. Jennifer Stewart ): no  Patient educated by nursing to practice social distancing, wear masks, wash hands frequently: yes      PSYCH:    Depression: 9   Anxiety: 9   SI denies suicidal ideation   HI Negative for homicidal ideation      AVH:Present - 'hearing voices'      GENERAL:    Appetite: decreased    Social: No   Speech: normal   Appearance: appropriately dressed and appropriately groomed    GROUP:    Group Participation: Yes  Participation Quality: Minimal    Notes: Pt A&O x 4 with good eye contact & sad/worried facial expression. Pt with depressed, anxious & sad mood. Pt pleasant, cooperative & med compliant this evening. Pt in the day area this evening, not interacting with peers. Pt denies SI/HI. Pt admits to hearing voices. Pt aswleep by 2145. Will continue to monitor via 15 minute rounds.         Electronically signed by Randa Lemon RN on 1/8/2022 at 11:35 PM

## 2022-01-09 NOTE — PROGRESS NOTES
Progress Note  Sydney Miedl  1/8/2022 11:25 PM  Subjective:   Admit Date:   1/7/2022      CC/ADMIT DX:       Interval History:   Reviewed overnight events and nursing notes. He has no new medical issues. I have reviewed all labs/diagnostics from the last 24hrs. ROS:   I have done a 10 point ROS and all are negative, except what is mentioned in the HPI. ADULT DIET; Regular    Medications:      vitamin D  50,000 Units Oral Weekly    cyanocobalamin  1,000 mcg IntraMUSCular Daily    Followed by   Kamran Oconnor ON 1/21/2022] cyanocobalamin  1,000 mcg IntraMUSCular Weekly    Followed by   Kamran Oconnor ON 3/11/2022] cyanocobalamin  1,000 mcg IntraMUSCular Q30 Days    risperiDONE  2 mg Oral Nightly    nicotine  1 patch TransDERmal Daily    traZODone  50 mg Oral Nightly           Objective:   Vitals: BP (!) 120/90   Pulse 93   Temp 98.4 °F (36.9 °C) (Temporal)   Resp 18   Ht 5' 5\" (1.651 m)   Wt 126 lb 9.6 oz (57.4 kg)   SpO2 99%   BMI 21.07 kg/m²  No intake or output data in the 24 hours ending 01/08/22 5485  General appearance: alert and cooperative with exam  Extremities: extremities normal, atraumatic, no cyanosis or edema  Neurologic:  No obvious focal neurologic deficits. Skin: no rashes    Assessment and Plan: Active Problems:    Depression with suicidal ideation    Psychosis ([facility] [address]  Resolved Problems:    * No resolved hospital problems. *    Vit D and B12 Def    Plan:  1. Continue present medication(s)   2. Replace Vit D and B12  3. Follow with Psych      Discharge planning:   home     Reviewed treatment plans with the patient and/or family.              Electronically signed by Augustus Ernandez MD on 1/8/2022 at 11:25 PM

## 2022-01-10 PROCEDURE — 1240000000 HC EMOTIONAL WELLNESS R&B

## 2022-01-10 PROCEDURE — 6360000002 HC RX W HCPCS: Performed by: FAMILY MEDICINE

## 2022-01-10 PROCEDURE — 6370000000 HC RX 637 (ALT 250 FOR IP): Performed by: PSYCHIATRY & NEUROLOGY

## 2022-01-10 PROCEDURE — 99233 SBSQ HOSP IP/OBS HIGH 50: CPT | Performed by: PSYCHIATRY & NEUROLOGY

## 2022-01-10 RX ADMIN — TRAZODONE HYDROCHLORIDE 50 MG: 50 TABLET ORAL at 21:16

## 2022-01-10 RX ADMIN — CYANOCOBALAMIN 1000 MCG: 1000 INJECTION, SOLUTION INTRAMUSCULAR at 08:47

## 2022-01-10 RX ADMIN — RISPERIDONE 2 MG: 1 TABLET ORAL at 21:16

## 2022-01-10 NOTE — PROGRESS NOTES
Addended by: RHETT VALDES on: 6/16/2020 09:26 AM     Modules accepted: Orders     Treatment Team Note:    GUANAKITO met with [facility] team to discuss Pts [address] plans. Progress/Behavior/Group Attendance: TBD    Target Symptoms/Reason for admission: male who presents to the emergency department for evaluation regarding feelings of depression and anxiety. Patient states that he has a prior history of schizophrenia and feels like his thoughts are racing. He states he has been having some thoughts about wanting to harm himself. Patient states that he has a prior history of inpatient hospitalization most recently was about a year ago. Diagnoses: Psychosis unspecified, R/o Schizoaffective disorder, Suicidal ideation, Tobacco use disorder    UDS: Neg    BAL: Neg    AftercarePlan: [address] lives with: GUANAKITO will meet with pt to gather information. Collateral obtained from: GUANAKITO will meet with pt to gather release of information.   On:    Family Session: ONELIA    Misc:

## 2022-01-10 NOTE — PLAN OF CARE
Group Therapy Note     Date: 1/10/2022  Start Time: 0452  End Time:  1600  Number of Participants: 10     Type of Group: Recovery     Wellness Binder Information  Module Name:  emotional wellness  Session Number:  1     Patient's Goal:  validation of feelings     Notes:  pt was verbally prompted to attend group. Pt refused. Information about emotional wellness was provided. Status After Intervention:       Participation Level:      Participation Quality:         Speech:           Thought Process/Content:         Affective Functioning:         Mood:         Level of consciousness:          Response to Learning:         Endings:      Modes of Intervention:         Discipline Responsible: Psychoeducational Specialist        Signature:  Aleyda Yi

## 2022-01-10 NOTE — PLAN OF CARE
Group Therapy Note     Date: 1/10/2022  Start Time: 1100  End Time:  4816  Number of Participants: 10     Type of Group: Psychoeducation     Wellness Binder Information  Module Name:  staying well  Session Number:  1     Patient's Goal:  daily maintenance and coping skills     Notes:  pt was verbally prompted to attend group. Pt refused. Information about coping skills was provided. Status After Intervention:       Participation Level:      Participation Quality:         Speech:           Thought Process/Content:         Affective Functioning:         Mood:         Level of consciousness:          Response to Learning:         Endings:      Modes of Intervention:         Discipline Responsible: Psychoeducational Specialist        Signature:  Cecil Jacobsen

## 2022-01-10 NOTE — PROGRESS NOTES
Department of Psychiatry  Attending Progress Note     Chief complaint: \"Doing a bit better\"    SUBJECTIVE:   Chart reviewed, discussed with the team.  Patient isolates to self. Sleeping and eating well. Med compliant. Patient seen in the dining area today. He is calm and cooperative. States he is doing better overall. Reports improvement in auditory hallucinations. Denies suicidal ideation. No complaints. OBJECTIVE    Physical  Wt Readings from Last 3 Encounters:   01/07/22 126 lb 9.6 oz (57.4 kg)     Temp Readings from Last 3 Encounters:   01/10/22 97.5 °F (36.4 °C) (Temporal)     BP Readings from Last 3 Encounters:   01/10/22 120/79     Pulse Readings from Last 3 Encounters:   01/10/22 103        Review of Systems: 14-point review of systems negative except as described above    Mental Status Examination:   Appearance:  Stated age. Microcephaly. Gait stable. No abnormal movements or tremor. Behavior: Calm, coperative  Speech: Normal in tone, volume, and quality. No slurring, dysarthria or pressured speech noted. Mood: \"A little better \"   Affect: Constricted  Thought Process: Appears linear. Thought Content: Denies SI/HI . No overt delusions or paranoia appreciated. Perceptions: Intermittent auditory hallucinations. Dennies visual hallucinations at present time. Not responding to internal stimuli. Concentration: Intact. Orientation: to person, place, date, and situation. Language: Intact. Fund of information: Intact. Memory: Recent and remote appear intact. Neurovegitative: Improved appetite and sleep. Insight: Improving. Judgment: Improving.     Data  Lab Results   Component Value Date    WBC 10.0 01/07/2022    HGB 14.3 01/07/2022    HCT 44.0 01/07/2022    MCV 91.1 01/07/2022     01/07/2022      Lab Results   Component Value Date     01/07/2022    K 3.7 01/07/2022     01/07/2022    CO2 26 01/07/2022    BUN 7 01/07/2022    CREATININE 0.9 01/07/2022    GLUCOSE 98 01/07/2022    CALCIUM 9.6 01/07/2022    PROT 6.6 01/07/2022    LABALBU 5.0 01/07/2022    BILITOT <0.2 01/07/2022    ALKPHOS 56 01/07/2022    AST 10 01/07/2022    ALT 6 01/07/2022    LABGLOM >60 01/07/2022    GFRAA >59 01/07/2022       Medications    Current Facility-Administered Medications:     vitamin D (ERGOCALCIFEROL) capsule 50,000 Units, 50,000 Units, Oral, Weekly, Jonathan Penaloza MD, 50,000 Units at 01/08/22 1417    cyanocobalamin injection 1,000 mcg, 1,000 mcg, IntraMUSCular, Daily, 1,000 mcg at 01/09/22 0814 **FOLLOWED BY** [START ON 1/21/2022] cyanocobalamin injection 1,000 mcg, 1,000 mcg, IntraMUSCular, Weekly **FOLLOWED BY** [START ON 3/11/2022] cyanocobalamin injection 1,000 mcg, 1,000 mcg, IntraMUSCular, Q30 Days, Jonathan Penaloza MD    risperiDONE (RISPERDAL) tablet 2 mg, 2 mg, Oral, Nightly, Josephine Mckeon MD, 2 mg at 01/09/22 2031    nicotine (NICODERM CQ) 21 MG/24HR 1 patch, 1 patch, TransDERmal, Daily, Carol Salomon MD, 1 patch at 01/10/22 0847    traZODone (DESYREL) tablet 50 mg, 50 mg, Oral, Nightly, Josephine Mckeon MD, 50 mg at 01/09/22 2031    hydrOXYzine (ATARAX) tablet 25 mg, 25 mg, Oral, TID PRN, Josephine Mckeon MD, 25 mg at 01/08/22 1551    ASSESSMENT AND PLAN  DSM 5 DIAGNOSIS  Impression  Psychosis unspecified  R/o Schizoaffective disorder  Tobacco use disorder  Vit B12 deficiency, severe  Vit D deficiency    Continue to observe. Med adjustment. Plan:   1. Psychiatric Medications:   Continue current regimen. Monitor for side effects. The risks, benefits, side effects, indications, contraindications, alternatives and adverse effects of the medications have been discussed with patient. 2. Continue to provide supportive psychotherapy. Encourage socialization and participation in recreational activities. Work on coping skills. 3. Medical Issues:    Continue medical monitoring by Dr. Kin Jones and associates.       4. Disposition:     to provide outpatient resources and facilitate disposition.      Amount of time spent with patient:      35 minutes with greater than 50 % of the time spent in counseling and collaboration of care

## 2022-01-10 NOTE — PROGRESS NOTES
Group Therapy Note    Start Time: 800  End Time:  665  Number of Participants: 14    Type of Group: Community Meeting       Patient's Goal:        Notes:  Did not participate      Participation Level:       Participation Quality:        Thought Process/Content:       Affective Functioning:       Mood:       Level of consciousness:        Modes of Intervention: Support      Discipline Responsible: Behavioral Health Tech II      Signature:  Nickie Whiting

## 2022-01-10 NOTE — PLAN OF CARE
Problem: Discharge Planning:  Goal: Discharged to appropriate level of care  Description: Discharged to appropriate level of care  Outcome: Ongoing     Problem: Health Maintenance - Impaired:  Goal: Ability to perform activities of daily living will improve  Description: Ability to perform activities of daily living will improve  Outcome: Ongoing  Goal: Able to sleep without medication for appropriate length of time  Description: Able to sleep without medication for appropriate length of time  Outcome: Ongoing  Goal: Maintenance of adequate nutrition will improve  Description: Maintenance of adequate nutrition will improve  Outcome: Ongoing     Problem: Mood - Altered:  Goal: Mood stable  Description: Mood stable  Outcome: Ongoing     Problem: Self-Esteem - Low:  Goal: Demonstrates positive self-esteem  Description: Demonstrates positive self-esteem  Outcome: Ongoing     Problem: Cerebrospinal Fluid Leakage - Risk Of:  Goal: Demonstration of organized thought processes  Description: Demonstration of organized thought processes  Outcome: Ongoing     Problem: Violence - Risk of, Self/Other-Directed:  Goal: Knowledge of developmental care interventions  Description: Absence of violence  Outcome: Ongoing

## 2022-01-10 NOTE — PROGRESS NOTES
[facility] Daily Shift Assessment-Adult Unit  Nursing Progress Note          Room: [facility]-   Name: Brian Romero   Age: 29 y.o. Gender: male   Dx: <principal problem not specified>  Precautions: suicide risk  Inpatient Status: voluntary     SLEEP:    Sleep: Yes,   Sleep Quality Fair   Hours Slept: 9   Sleep Medications: Yes trazodone 50 mg risperdal 2 mg  PRN Sleep Meds: No       MEDICAL:      Other PRN Meds: No   Med Compliant: Yes   Accu-Chek: No   Oxygen/CPAP/BiPAP: No  CIWA/CINA: No   PAIN Assessment: none  Side Effects from medication: No    Is Patient experiencing any respiratory symptoms (headache, fever, body aches, cough. Zack Brittle ): no  Patient educated by nursing to practice social distancing, wear masks, wash hands frequently: yes      Metabolic Screening:    Lab Results   Component Value Date    LABA1C 5.2 01/08/2022       Lab Results   Component Value Date    CHOL 153 (L) 01/08/2022     Lab Results   Component Value Date    TRIG 91 01/08/2022     Lab Results   Component Value Date    HDL 46 (L) 01/08/2022     No components found for: LDLCAL  No results found for: LABVLDL      Body mass index is 21.07 kg/m². BP Readings from Last 2 Encounters:   01/09/22 127/80         PSYCH:     SI denies suicidal ideation    HI Negative for homicidal ideation        AVH:Present - sometimes has auditory hallucinations that he did not want to talk about      Depression: 4 Anxiety: 4       GENERAL:      Appetite: improved    Social: No Speech: normal   Appearance:appropriately dressed, appropriately groomed, good hygiene and healthy looking  Assistive Devices: none  Level of Assist: Independent      GROUP:    Group Participation: Yes  Participation LevelMinimal    Participation Quality: mininmal    Notes:  Pt was in the dining area during this interview,he is pleasant and cooperative. Eye contact is good,he is medication compliant,pt does report intermittent auditory hallucinations that affect his concentration and attention. Pt

## 2022-01-10 NOTE — FLOWSHEET NOTE
01/10/22 1441   Encounter Summary   Services provided to: Patient   Referral/Consult From: Nursing Supervisor/Manager   Complexity of Encounter Low   Length of Encounter 15 minutes   Spiritual/Latter day   Type Spiritual support   Assessment Calm   Intervention Active listening;Explored coping resources;Prayer   Outcome Expressed gratitude     Patient said that he spoke with a  earlier and he felt better, he did not need a visit from me today.

## 2022-01-10 NOTE — PROGRESS NOTES
Group Note    Number of Participants in Group: 13  Number of Patients on Unit:14      Patient attended group:Yes  Reason for Absence:  Intervention for patient absence:        Type of Group:   Wrap-Up/Relaxation    Patient's Goal: See wrap up group sheet    Participation Level:     Active particpant           Patient Response to Learning: Yes    Patient's Behavior: Withdrawn    Is Patient Social/Interacting: No    Relaxation:   Television:No   Reading:No   Game/Puzzle:No   Phone: No       Notes/Comments:      Please see patient's wrap up group sheet for patient's comments       Electronically signed by Tamera Peña on 1/10/22 at 1:03 AM CST

## 2022-01-11 PROCEDURE — 1240000000 HC EMOTIONAL WELLNESS R&B

## 2022-01-11 PROCEDURE — 6360000002 HC RX W HCPCS: Performed by: FAMILY MEDICINE

## 2022-01-11 PROCEDURE — 99233 SBSQ HOSP IP/OBS HIGH 50: CPT | Performed by: PSYCHIATRY & NEUROLOGY

## 2022-01-11 PROCEDURE — 6370000000 HC RX 637 (ALT 250 FOR IP): Performed by: PSYCHIATRY & NEUROLOGY

## 2022-01-11 RX ORDER — CITALOPRAM 20 MG/1
10 TABLET ORAL DAILY
Status: DISCONTINUED | OUTPATIENT
Start: 2022-01-11 | End: 2022-01-12 | Stop reason: HOSPADM

## 2022-01-11 RX ADMIN — CITALOPRAM HYDROBROMIDE 10 MG: 20 TABLET ORAL at 08:22

## 2022-01-11 RX ADMIN — HYDROXYZINE HYDROCHLORIDE 25 MG: 25 TABLET ORAL at 21:04

## 2022-01-11 RX ADMIN — RISPERIDONE 2 MG: 1 TABLET ORAL at 21:04

## 2022-01-11 RX ADMIN — TRAZODONE HYDROCHLORIDE 50 MG: 50 TABLET ORAL at 21:04

## 2022-01-11 RX ADMIN — CYANOCOBALAMIN 1000 MCG: 1000 INJECTION, SOLUTION INTRAMUSCULAR at 08:22

## 2022-01-11 NOTE — PLAN OF CARE
Group Therapy Note     Date: 1/11/2022  Start Time: 8022  End Time:  1600  Number of Participants: 11     Type of Group: Recovery     Wellness Binder Information  Module Name:  relapse prevention  Session Number:  2     Patient's Goal:  identifying early warning signs     Notes:  pt was verbally prompted to attend group. Pt refused. Information about relapse prevention was provided. Status After Intervention:       Participation Level:      Participation Quality:         Speech:           Thought Process/Content:         Affective Functioning:         Mood:         Level of consciousness:          Response to Learning:         Endings:      Modes of Intervention:         Discipline Responsible: Psychoeducational Specialist        Signature:  Abad Cook

## 2022-01-11 NOTE — PROGRESS NOTES
Treatment Team Note:     GUANAKITO met with [address] [address] team to discuss Pts [place] and DC plans.      Progress/Behavior/Group Attendance: TBD     Target Symptoms/Reason for admission: male who presents to the emergency department for evaluation regarding feelings of depression and anxiety.  Patient states that he has a prior history of schizophrenia and feels like his thoughts are racing. Pedro Horne states he has been having some thoughts about wanting to harm himself. Sd Patel states that he has a prior history of inpatient hospitalization most recently was about a year ago.     Diagnoses: Psychosis unspecified, R/o Schizoaffective disorder, Suicidal ideation, Tobacco use disorder     UDS: Neg     BAL: Neg     AftercarePlan: [address]     Pt lives with: [facility]     Collateral obtained from: GUANAKITO will meet with pt to gather release of information.   On:     Family Session: ONELIA     Misc:

## 2022-01-11 NOTE — PROGRESS NOTES
Department of Psychiatry  Attending Progress Note     Chief complaint: \"Better\"    SUBJECTIVE:   Chart reviewed, discussed with the team.  No issues overnight. Patient goes to groups. Sleeping and eating well. Med compliant. Patient seen in the dining area today. He is calm and cooperative. Brighter affect. Smiled. \"Mood is better and voices are better. \" Denies suicidal ideation. No complaints. OBJECTIVE    Physical  Wt Readings from Last 3 Encounters:   01/07/22 126 lb 9.6 oz (57.4 kg)     Temp Readings from Last 3 Encounters:   01/11/22 98.1 °F (36.7 °C) (Temporal)     BP Readings from Last 3 Encounters:   01/11/22 101/73     Pulse Readings from Last 3 Encounters:   01/11/22 131        Review of Systems: 14-point review of systems negative except as described above    Mental Status Examination:   Appearance:  Stated age. Microcephaly. Gait stable. No abnormal movements or tremor. Behavior: Calm, cooperative, smiled  Speech: Normal in tone, volume, and quality. No slurring, dysarthria or pressured speech noted. Mood: \"Better \"   Affect: Brighter  Thought Process: Appears linear. Thought Content: Denies SI/HI . No overt delusions or paranoia appreciated. Perceptions: Intermittent auditory hallucinations. Dennies visual hallucinations at present time. Not responding to internal stimuli. Concentration: Intact. Orientation: to person, place, date, and situation. Language: Intact. Fund of information: Intact. Memory: Recent and remote appear intact. Neurovegitative: Improved appetite and sleep. Insight: Improving. Judgment: Improving.     Data  Lab Results   Component Value Date    WBC 10.0 01/07/2022    HGB 14.3 01/07/2022    HCT 44.0 01/07/2022    MCV 91.1 01/07/2022     01/07/2022      Lab Results   Component Value Date     01/07/2022    K 3.7 01/07/2022     01/07/2022    CO2 26 01/07/2022    BUN 7 01/07/2022    CREATININE 0.9 01/07/2022    GLUCOSE 98 01/07/2022 CALCIUM 9.6 01/07/2022    PROT 6.6 01/07/2022    LABALBU 5.0 01/07/2022    BILITOT <0.2 01/07/2022    ALKPHOS 56 01/07/2022    AST 10 01/07/2022    ALT 6 01/07/2022    LABGLOM >60 01/07/2022    GFRAA >59 01/07/2022       Medications    Current Facility-Administered Medications:     citalopram (CELEXA) tablet 10 mg, 10 mg, Oral, Daily, Elliott Peterson MD, 10 mg at 01/11/22 1591    vitamin D (ERGOCALCIFEROL) capsule 50,000 Units, 50,000 Units, Oral, Weekly, Piyush Hdz MD, 50,000 Units at 01/08/22 1417    cyanocobalamin injection 1,000 mcg, 1,000 mcg, IntraMUSCular, Daily, 1,000 mcg at 01/11/22 0822 **FOLLOWED BY** [START ON 1/21/2022] cyanocobalamin injection 1,000 mcg, 1,000 mcg, IntraMUSCular, Weekly **FOLLOWED BY** [START ON 3/11/2022] cyanocobalamin injection 1,000 mcg, 1,000 mcg, IntraMUSCular, Q30 Days, Piyush Hdz MD    risperiDONE (RISPERDAL) tablet 2 mg, 2 mg, Oral, Nightly, Elliott Peterson MD, 2 mg at 01/10/22 2116    nicotine (NICODERM CQ) 21 MG/24HR 1 patch, 1 patch, TransDERmal, Daily, Ingrid Vazquez MD, 1 patch at 01/11/22 0115    traZODone (DESYREL) tablet 50 mg, 50 mg, Oral, Nightly, Elliott Peterson MD, 50 mg at 01/10/22 2116    hydrOXYzine (ATARAX) tablet 25 mg, 25 mg, Oral, TID PRN, Elliott Peterson MD, 25 mg at 01/08/22 1551    ASSESSMENT AND PLAN  DSM 5 DIAGNOSIS  Impression  Psychosis unspecified  R/o Schizoaffective disorder  Tobacco use disorder  Vit B12 deficiency, severe  Vit D deficiency    Improving. Continue to observe. Med adjustment. Plan:   1. Psychiatric Medications:   Restart Celexa for depressed mood. Monitor for side effects. The risks, benefits, side effects, indications, contraindications, alternatives and adverse effects of the medications have been discussed with patient. 2. Continue to provide supportive psychotherapy. Encourage socialization and participation in recreational activities. Work on coping skills.     3. Medical Issues:    Continue medical monitoring by Dr. Heather Jernigan and associates. 4. Disposition:     to provide outpatient resources and facilitate disposition.      Amount of time spent with patient:      35 minutes with greater than 50 % of the time spent in counseling and collaboration of care

## 2022-01-11 NOTE — PLAN OF CARE
Group Therapy Note    Date: 1/11/2022  Start Time: 1000  End Time:  1030  Number of Participants: 10    Type of Group: Psychoeducation    Wellness Binder Information  Module Name:  [address] [place] [address]  Session Number:  1    Group Goal for Pt: To improve knowledge of practical facts about depression    Notes:  Pt did not attend group activity. Pt was invited/encouraged. Status After Intervention:      Participation Level:     Participation Quality:       Speech:         Thought Process/Content:       Affective Functioning:       Mood:       Level of consciousness:        Response to Learning:       Endings:     Modes of Intervention:       Discipline Responsible:       Signature:  Florentin Mcfarland

## 2022-01-11 NOTE — PLAN OF CARE
Group Therapy Note     Date: 1/11/2022  Start Time: 1100  End Time:  1130  Number of Participants: 11     Type of Group: Psychoeducation     Wellness Binder Information  Module Name:  emotional wellness  Session Number:  5     Patient's Goal:  obstacles to emotional wellness     Notes:  pt was verbally prompted to attend group. Pt refused. Information about emotional wellness was provided. Status After Intervention:       Participation Level:      Participation Quality:         Speech:           Thought Process/Content:         Affective Functioning:         Mood:         Level of consciousness:          Response to Learning:         Endings:      Modes of Intervention:         Discipline Responsible: Psychoeducational Specialist        Signature:  Sherie Kim

## 2022-01-11 NOTE — PROGRESS NOTES
[facility] Adult Unit Daily Assessment  Nursing Progress Note    Room: [facility]/608-01   Name: Cheyenne Coto   Age: 29 y.o. Gender: male   Dx: <principal problem not specified>  Precautions: suicide risk  Inpatient Status: voluntary       SLEEP:    Sleep Quality Good  Sleep Medications: Yes , trazodone 50mg  PRN Sleep Meds: No       MEDICAL:    Other PRN Meds: No   Med Compliant: Yes  Accu-Chek: No  Oxygen/CPAP/BiPAP: No  CIWA/CINA: No   PAIN Assessment: none  Side Effects from medication: No    Is Patient experiencing any respiratory symptoms (headache, fever, body aches, cough. Larisa Kid ): no  Patient educated by nursing to practice social distancing, wear masks, wash hands frequently: yes      PSYCH:    Depression: 6   Anxiety: 5   SI denies suicidal ideation   HI Negative for homicidal ideation      AVH:Absent      GENERAL:    Appetite: good    Social: No   Speech: normal   Appearance: appropriately dressed and healthy looking      Notes:  Patient isolated himself in his room most of the evening. Patient was withdrawn, and had a sad facial expression during the interview. Patient is not social. Patient stated he still was upset because his girlfriend and him broke up. Patient reported that he has been living in the half way house for two months and hoping he would find a job. Continue to monitor for safety.          Electronically signed by Matty Gamble RN on 1/11/22 at 2:27 AM CST

## 2022-01-11 NOTE — PROGRESS NOTES
Progress Note  Sydney Miedl  1/10/2022 11:43 PM  Subjective:   Admit Date:   1/7/2022      CC/ADMIT DX:       Interval History:   Reviewed overnight events and nursing notes. He has no new medical issues. I have reviewed all labs/diagnostics from the last 24hrs. ROS:   I have done a 10 point ROS and all are negative, except what is mentioned in the HPI. ADULT DIET; Regular    Medications:      vitamin D  50,000 Units Oral Weekly    cyanocobalamin  1,000 mcg IntraMUSCular Daily    Followed by   Selvin Rolon ON 1/21/2022] cyanocobalamin  1,000 mcg IntraMUSCular Weekly    Followed by   Selvin Rolon ON 3/11/2022] cyanocobalamin  1,000 mcg IntraMUSCular Q30 Days    risperiDONE  2 mg Oral Nightly    nicotine  1 patch TransDERmal Daily    traZODone  50 mg Oral Nightly           Objective:   Vitals: /78   Pulse 91   Temp 97.9 °F (36.6 °C)   Resp 16   Ht 5' 5\" (1.651 m)   Wt 126 lb 9.6 oz (57.4 kg)   SpO2 98%   BMI 21.07 kg/m²  No intake or output data in the 24 hours ending 01/10/22 0698  General appearance: alert and cooperative with exam  Extremities: extremities normal, atraumatic, no cyanosis or edema  Neurologic:  No obvious focal neurologic deficits. Skin: no rashes    Assessment and Plan: Active Problems:    Depression with suicidal ideation    Psychosis ([address]  Resolved Problems:    * No resolved hospital problems. *    Vit D and B12 Def    Elevated BP    Plan:  1. Continue present medication(s)   2. He is medically stable. I will monitor for any changes or concerns. 3.  Follow with Psych      Discharge planning:   home     Reviewed treatment plans with the patient and/or family.              Electronically signed by Christina Perez MD on 1/10/2022 at 11:43 PM

## 2022-01-11 NOTE — PROGRESS NOTES
Progress Note  Sydney Miedl  1/11/2022 12:06 PM  Subjective:   Admit Date:   1/7/2022      CC/ADMIT DX:       Interval History:   Reviewed overnight events and nursing notes. He denies any physical complaints. I have reviewed all labs/diagnostics from the last 24hrs. ROS:   I have done a 10 point ROS and all are negative, except what is mentioned in the HPI. ADULT DIET; Regular    Medications:      citalopram  10 mg Oral Daily    vitamin D  50,000 Units Oral Weekly    cyanocobalamin  1,000 mcg IntraMUSCular Daily    Followed by   Creola Felty ON 1/21/2022] cyanocobalamin  1,000 mcg IntraMUSCular Weekly    Followed by   Creola Felty ON 3/11/2022] cyanocobalamin  1,000 mcg IntraMUSCular Q30 Days    risperiDONE  2 mg Oral Nightly    nicotine  1 patch TransDERmal Daily    traZODone  50 mg Oral Nightly           Objective:   Vitals: /73   Pulse 131   Temp 98.1 °F (36.7 °C) (Temporal)   Resp 20   Ht 5' 5\" (1.651 m)   Wt 126 lb 9.6 oz (57.4 kg)   SpO2 98%   BMI 21.07 kg/m²  No intake or output data in the 24 hours ending 01/11/22 1206  General appearance: alert and cooperative with exam  Extremities: extremities normal, atraumatic, no cyanosis or edema  Neurologic:  No obvious focal neurologic deficits. Skin: no rashes    Assessment and Plan: Active Problems:    Depression with suicidal ideation    Psychosis ([address]  Resolved Problems:    * No resolved hospital problems. *    Vit D and B12 Def    Elevated BP    Plan:  1. Continue present medication(s)   2.  He remains medically stable. I will monitor for any changes or concerns. 3.  Follow with Psych      Discharge planning:   home     Reviewed treatment plans with the patient and/or family.              Electronically signed by Georgette Estevez MD on 1/11/2022 at 12:06 PM

## 2022-01-11 NOTE — PLAN OF CARE
Problem: Discharge Planning:  Goal: Discharged to appropriate level of care  Description: Discharged to appropriate level of care  1/11/2022 1246 by Nancy Velasquez RN  Outcome: Ongoing  1/11/2022 0209 by Micah Jules RN  Outcome: Ongoing     Problem: Health Maintenance - Impaired:  Goal: Ability to perform activities of daily living will improve  Description: Ability to perform activities of daily living will improve  1/11/2022 1246 by Nancy Velasquez RN  Outcome: Ongoing  1/11/2022 0209 by Micah Jules RN  Outcome: Ongoing  Goal: Able to sleep without medication for appropriate length of time  Description: Able to sleep without medication for appropriate length of time  1/11/2022 1246 by Nancy Velasquez RN  Outcome: Ongoing  1/11/2022 0209 by Micah Jules RN  Outcome: Ongoing  Goal: Maintenance of adequate nutrition will improve  Description: Maintenance of adequate nutrition will improve  1/11/2022 1246 by Nancy Velasquez RN  Outcome: Ongoing  1/11/2022 0209 by Micah Jules RN  Outcome: Ongoing     Problem: Mood - Altered:  Goal: Mood stable  Description: Mood stable  1/11/2022 1246 by Nancy Velasquez RN  Outcome: Ongoing  1/11/2022 0209 by Micah Jules RN  Outcome: Ongoing     Problem: Self-Esteem - Low:  Goal: Demonstrates positive self-esteem  Description: Demonstrates positive self-esteem  1/11/2022 1246 by Nancy Velasquez RN  Outcome: Ongoing  1/11/2022 0209 by Micah Jules RN  Outcome: Ongoing     Problem: Violence - Risk of, Self/Other-Directed:  Goal: Knowledge of developmental care interventions  Description: Absence of violence  1/11/2022 1246 by Nancy Velasquez RN  Outcome: Ongoing  1/11/2022 0209 by Micah Jules RN  Outcome: Ongoing

## 2022-01-12 VITALS
WEIGHT: 126.6 LBS | DIASTOLIC BLOOD PRESSURE: 75 MMHG | OXYGEN SATURATION: 96 % | TEMPERATURE: 98.5 F | BODY MASS INDEX: 21.09 KG/M2 | RESPIRATION RATE: 20 BRPM | HEIGHT: 65 IN | HEART RATE: 120 BPM | SYSTOLIC BLOOD PRESSURE: 106 MMHG

## 2022-01-12 PROBLEM — F17.200 TOBACCO USE DISORDER: Chronic | Status: ACTIVE | Noted: 2022-01-12

## 2022-01-12 PROBLEM — R45.851 SUICIDAL IDEATION: Status: RESOLVED | Noted: 2022-01-12 | Resolved: 2022-01-12

## 2022-01-12 PROBLEM — F20.9 SCHIZOPHRENIA (HCC): Status: RESOLVED | Noted: 2022-01-12 | Resolved: 2022-01-12

## 2022-01-12 PROBLEM — R45.851 DEPRESSION WITH SUICIDAL IDEATION: Status: RESOLVED | Noted: 2022-01-07 | Resolved: 2022-01-12

## 2022-01-12 PROBLEM — F32.A DEPRESSION WITH SUICIDAL IDEATION: Status: RESOLVED | Noted: 2022-01-07 | Resolved: 2022-01-12

## 2022-01-12 PROBLEM — F25.9 SCHIZOAFFECTIVE DISORDER (HCC): Status: ACTIVE | Noted: 2022-01-07

## 2022-01-12 PROBLEM — R45.851 SUICIDAL IDEATION: Status: ACTIVE | Noted: 2022-01-12

## 2022-01-12 PROBLEM — F20.9 SCHIZOPHRENIA (HCC): Status: ACTIVE | Noted: 2022-01-12

## 2022-01-12 PROCEDURE — 5130000000 HC BRIDGE APPOINTMENT

## 2022-01-12 PROCEDURE — 6360000002 HC RX W HCPCS: Performed by: FAMILY MEDICINE

## 2022-01-12 PROCEDURE — 6370000000 HC RX 637 (ALT 250 FOR IP): Performed by: PSYCHIATRY & NEUROLOGY

## 2022-01-12 PROCEDURE — 99239 HOSP IP/OBS DSCHRG MGMT >30: CPT | Performed by: PSYCHIATRY & NEUROLOGY

## 2022-01-12 RX ORDER — RISPERIDONE 2 MG/1
2 TABLET, FILM COATED ORAL NIGHTLY
Qty: 30 TABLET | Refills: 1 | Status: SHIPPED | OUTPATIENT
Start: 2022-01-12 | End: 2022-02-11

## 2022-01-12 RX ORDER — TRAZODONE HYDROCHLORIDE 50 MG/1
50 TABLET ORAL NIGHTLY
Qty: 30 TABLET | Refills: 1 | Status: SHIPPED | OUTPATIENT
Start: 2022-01-12 | End: 2022-02-11

## 2022-01-12 RX ORDER — CHOLECALCIFEROL (VITAMIN D3) 125 MCG
500 CAPSULE ORAL DAILY
Qty: 30 TABLET | Refills: 1 | Status: SHIPPED | OUTPATIENT
Start: 2022-01-12 | End: 2022-02-11

## 2022-01-12 RX ORDER — CITALOPRAM 20 MG/1
20 TABLET ORAL DAILY
Qty: 30 TABLET | Refills: 1 | Status: SHIPPED | OUTPATIENT
Start: 2022-01-13 | End: 2022-02-12

## 2022-01-12 RX ORDER — ERGOCALCIFEROL 1.25 MG/1
50000 CAPSULE ORAL WEEKLY
Qty: 11 CAPSULE | Refills: 1 | Status: SHIPPED | OUTPATIENT
Start: 2022-01-15 | End: 2022-03-27

## 2022-01-12 RX ADMIN — CYANOCOBALAMIN 1000 MCG: 1000 INJECTION, SOLUTION INTRAMUSCULAR at 09:24

## 2022-01-12 RX ADMIN — CITALOPRAM HYDROBROMIDE 10 MG: 20 TABLET ORAL at 09:23

## 2022-01-12 NOTE — BH NOTE
Received notification for Prior Authorization (PA) request for the following medication:    Risperidone    Submitted clinical information at this time to [employer], via covermymeds web portal, HIPPA compliant/Confidential web portal.  Response time varies, 1 to 5 business days for a response. PA Case ID#  [id number]    Electronically signed, Caren Sanches.  Oleksandr SHEEHAN, Utilization Review , [facility],  [id number] at [phone] PM

## 2022-01-12 NOTE — BH NOTE
Received notification from [facility] [address] regarding PA Case [id number] , medication has been approved, notified provider, nurse in charge of care and pharmacy of the determination. Electronically signed, Mckinley Leggett LPN, Utilization Review , [facility], on 1/12/2022 at 1227PM

## 2022-01-12 NOTE — PROGRESS NOTES
Discharge Note     Pt discharging on this date. Pt denies SI, HI, and AVH at this time. Pt reports improvement in behavior and is leaving unit in overall good condition. SW and pt discussed pt's follow up appointments and importance of attending appointments as scheduled, pt voiced understanding and agreement. Pt and SW also discussed pt safety plan and pt able to verbally identify: warning signs, coping strategies, places and people that help make the pt feel better/distract negative thoughts, friends/family/agencies/professionals the pt can reach out to in a crisis, and something that is important to the pt/worth living for. Pt provided the national suicide prevention hotline number (5-098-514-821-517-4840) as well as local community behavioral health [facility]) crisis number for emergencies (2-015-224-937-186-3197). Pt to follow up with:  [address]) on _01_/14__/22 @ 11:00am. Patient will follow up with [facility], UBALDO at [facility] for medication management, patient will be seen on _01_/_21_/22 @ 4:30pm for the med management appt.      Referral to out patient tobacco cessation counseling treatment:    Patient refused referral to outpatient tobacco cessation counseling    SW offered to assist pt with transportation, pt reports that he will need a cab to [facility]

## 2022-01-12 NOTE — DISCHARGE INSTR - DIET

## 2022-01-12 NOTE — PROGRESS NOTES
Treatment Team Note:     GUANAKITO met with [address] [place] [address] team to discuss Pts TX and DC plans.      Progress/Behavior/Group Attendance: TBD     Target Symptoms/Reason for admission: male who presents to the emergency department for evaluation regarding feelings of depression and anxiety.  Patient states that he has a prior history of schizophrenia and feels like his thoughts are racing. Cheryn Kanner states he has been having some thoughts about wanting to harm himself. Brandon Stokes states that he has a prior history of inpatient hospitalization most recently was about a year ago.     Diagnoses: Psychosis unspecified, R/o Schizoaffective disorder, Suicidal ideation, Tobacco use disorder     UDS: Neg     [facility]     Pt lives with: [facility]     Collateral obtained from: GUANAKITO will meet with pt to gather release of information.   On:     Family Session: ONELIA     Misc:

## 2022-01-12 NOTE — PROGRESS NOTES
[facility] Adult Unit Daily Assessment  Nursing Progress Note    Room: [facility]/608-01   Name: Valentin Brito   Age: 29 y.o. Gender: male   Dx: <principal problem not specified>  Precautions: suicide risk  Inpatient Status: voluntary       SLEEP:    Sleep Quality Poor  Sleep Medications: Yes   PRN Sleep Meds: Yes       MEDICAL:    Other PRN Meds: No   Med Compliant: Yes  Accu-Chek: No  Oxygen/CPAP/BiPAP: No  CIWA/CINA: No   PAIN Assessment: none  Side Effects from medication: No    Is Patient experiencing any respiratory symptoms (headache, fever, body aches, cough. Jennifer Stewart ): no  Patient educated by nursing to practice social distancing, wear masks, wash hands frequently: yes      PSYCH:    Depression: 7   Anxiety: 5   SI denies suicidal ideation   HI Negative for homicidal ideation      AVH:Absent      GENERAL:    Appetite: decreased    Social: No   Speech: normal   Appearance: appropriately dressed and healthy looking    GROUP:    Group Participation: No  Participation Quality: None    Notes:     Patient is isolative to his room. He is cooperative with staff and compliant with medications. He denies SI, HI, and AVH at this time.     Electronically signed by Cinthya Osman LPN on 7/33/09 at 07:16 AM CST

## 2022-01-12 NOTE — PROGRESS NOTES
CLINICAL PHARMACY NOTE: MEDS TO BEDS    Total # of Prescriptions Filled: 4   The following medications were delivered to the patient:  · Trazodone 50mg  · Vitamin B-12  · Vitamin D  · Citalopram 20mg    Additional Documentation:  The patient had a zero co pay, James Ramachandran RN picked up at the window.

## 2022-01-12 NOTE — PLAN OF CARE
Problem: Discharge Planning:  Goal: Discharged to appropriate level of care  Description: Discharged to appropriate level of care  Outcome: Completed     Problem: Health Maintenance - Impaired:  Goal: Ability to perform activities of daily living will improve  Description: Ability to perform activities of daily living will improve  Outcome: Completed  Goal: Able to sleep without medication for appropriate length of time  Description: Able to sleep without medication for appropriate length of time  Outcome: Completed  Goal: Maintenance of adequate nutrition will improve  Description: Maintenance of adequate nutrition will improve  Outcome: Completed     Problem: Mood - Altered:  Goal: Mood stable  Description: Mood stable  Outcome: Completed     Problem: Self-Esteem - Low:  Goal: Demonstrates positive self-esteem  Description: Demonstrates positive self-esteem  Outcome: Completed     Problem: Cerebrospinal Fluid Leakage - Risk Of:  Goal: Demonstration of organized thought processes  Description: Demonstration of organized thought processes  Outcome: Completed     Problem: Violence - Risk of, Self/Other-Directed:  Goal: Knowledge of developmental care interventions  Description: Absence of violence  Outcome: Completed

## 2022-01-12 NOTE — PROGRESS NOTES
Group Therapy Note    Start Time: 800  End Time:  741  Number of Participants: 14    Type of Group: Community Meeting       Patient's Goal:        Notes:  Did not participate      Participation Level:       Participation Quality:        Thought Process/Content:       Affective Functioning:       Mood:       Level of consciousness:        Modes of Intervention: Support      Discipline Responsible: Behavioral Health Tech II      Signature:  Artist Bussing

## 2022-01-12 NOTE — PROGRESS NOTES
[facility]  Discharge Note  Bridge Appointment completed: Reviewed Discharge Instructions with patient. Patient verbalizes understanding and agreement with the discharge plan using the teachback method. Referral for Outpatient Tobacco Cessation Counseling, upon discharge (krish X if applicable and completed):    ( )  Hospital staff assisted patient to call Quit Line or faxed referral                                   during hospitalization                  ( )  Recognizing danger situations (included triggers and roadblocks), if not completed on admission                    ( )  Coping skills (new ways to manage stress, exercise, relaxation techniques, changing routine, distraction), if not completed on admission                                                           ( )  Basic information about quitting (benefits of quitting, techniques in how to quit, available resources, if not completed on admission  ( ) Referral for counseling faxed to [facility]   ( ) Patient refused referral  (x ) Patient refused counseling  ( ) Patient refused smoking cessation medication upon discharge    Vaccinations (krish X if applicable and completed):  ( ) Patient states already received influenza vaccine elsewhere  ( ) Patient received influenza vaccine during this hospitalization  (x ) Patient refused influenza vaccine at this time      Pt discharged with followings belongings:       Valuables retrieved from safe and returned to patient. Patient left department with self via cab, discharged to self care. Patient education on aftercare instructions: Yes  Patient verbalize understanding of AVS:  yes. Suicidal Ideations? No AVH? Denies   HI?  Negative for homicidal ideation       Status EXAM upon discharge:  Status and Exam  Normal: Yes  Facial Expression: Brightened  Affect: Appropriate  Level of Consciousness: Alert  Mood:Normal: Yes  Mood: Other (Comment) (WNL)  Motor Activity:Normal: Yes  Motor Activity: Increased  Interview Behavior: Cooperative  Preception: [place] to Person,[place] to Time,[place] to Place,[place] to Situation  Attention:Normal: No  Attention: Distractible  Thought Processes: Circumstantial  Thought Content:Normal: Yes  Thought Content: Preoccupations  Hallucinations: None  Delusions: No  Delusions: Persecution  Memory:Normal: Yes  Memory: Poor Recent,Poor Remote  Insight and Judgment: No  Insight and Judgment: Poor Insight,Poor Judgment  Present Suicidal Ideation: No  Present Homicidal Ideation: No

## 2022-01-12 NOTE — DISCHARGE SUMMARY
Discharge Summary     Patient ID:  Roxanne Joshi  [id number]  3 y.o.  1985    Admit date: 1/7/2022  Discharge date: 1/12/2022    Admitting Physician: Armando Champagne MD   Attending Physician: Tyler Santiago MD  Discharge Provider: Tyler Santiago MD     Admission Diagnoses:  Psychosis unspecified  R/o Schizoaffective disorder  Tobacco use disorder  Vit B12 deficiency, severe  Vit D deficiency    Discharge Diagnoses:   Schizoaffective disorder  Tobacco use disorder  Vit B12 deficiency, severe  Vit D deficiency    Admission Condition: poor    Discharged Condition: stable    Indication for Admission: Suicidal ideation     CHIEF COMPLAINT:  \"Hearing voices\"     History obtained from: patient, chart     HISTORY OF PRESENT ILLNESS:    29 y. o. white male from a shelter house who presented to the emergency department for evaluation of depression and anxiety. [facility] has been having  thoughts about harming himself.  UDS negative.     Patient seen resting in bed this morning. He is calm and cooperative. He reports suicidal ideation. Reports hearing voices for a number of years - \"not sure what they are saying, they come and go\". States he was diagnosed with schizophrenia several years ago. He was taking Risperdal which was helping and ran out about 4 months ago. States he did not have money for the medication. He was released from retirement about 6 months ago and has been staying in a shelter house. States recently he has been working and saving money. He has no social support. He stopped communicating with his relatives a while ago. He is thinking about reaching out to his sister. He denies mood swings and racing thoughts. He denies decreased need for sleep. He denies paranoia. States he was somewhat paranoid when she was at home. He is open to medication adjustment.     PSYCHIATRIC HISTORY:    Diagnoses: Schizophrenia ?   Suicide attempts/gestures: Denies   Prior hospitalizations: about 1 yr ago   Medication trials: Risperdal, [facility]  Mental health contact: Lost to follow-up   Head trauma: Denies     SUBSTANCE USE HISTORY:  In the past abused alcohol and meth. No recent substance use. Smokes cigarettes. Hospital Course:  Patient was admitted to the behavioral health floor and was acclimated to the unit. He was placed on suicide precautions. Labs were reviewed and physical exam was completed by Dr. Jason Nielson and associates. Home medications were reconciled. MIGUEL was obtained and reviewed. Patient was given Risperdal for psychosis and Celexa for depression. He tolerated his medications well. Patient attended and participated in groups. All interactions with the peers and staff members were appropriate. Behaviorally, he was not a problem. There was no evidence of suicidality or psychosis. Sleep and appetite improved. With the above-mentioned medications changes as well as psychotherapeutic interventions, patient reported considerable improvement in his condition and requested discharge. It was felt that patient was at his baseline. Patient has no access to guns at home. On 1/12/2022 it was therefore decided to discharge the patient, as it was felt that he received maximal benefit from his hospitalization. This patient is not suicidal, homicidal or psychotic at discharge. He does not present danger to self or others.       Number of antipsychotic medication prescribed at discharge: 1  IF MORE THAN ONE IS USED: NA    History of greater than 3 failed multiple monotherapy trials: NA  Monotherapy taper plan/ cross taper in progress: NA  Augmentation of Clozapine: NA    Referral to addiction treatment: patient refused    Prescription for Alcohol or Drug Disorder Medication: patient refused    Prescription for Tobacco Cessation medication: none    If no prescriptions for Tobacco Cessation must document why: patient refused    Consults: Internal medicine    Significant Diagnostic Studies:   Lab Results   Component Value Date    WBC 10.0 01/07/2022    HGB 14.3 01/07/2022    HCT 44.0 01/07/2022    MCV 91.1 01/07/2022     01/07/2022     Lab Results   Component Value Date     01/07/2022    K 3.7 01/07/2022     01/07/2022    CO2 26 01/07/2022    BUN 7 01/07/2022    CREATININE 0.9 01/07/2022    GLUCOSE 98 01/07/2022    CALCIUM 9.6 01/07/2022    PROT 6.6 01/07/2022    LABALBU 5.0 01/07/2022    BILITOT <0.2 01/07/2022    ALKPHOS 56 01/07/2022    AST 10 01/07/2022    ALT 6 01/07/2022    LABGLOM >60 01/07/2022    GFRAA >59 01/07/2022         Lab Results   Component Value Date    YJYRDKOE51 168 (L) 01/08/2022     Lab Results   Component Value Date    VITD25 18.8 (L) 01/08/2022     Lab Results   Component Value Date    CHOL 153 (L) 01/08/2022     Lab Results   Component Value Date    TRIG 91 01/08/2022     Lab Results   Component Value Date    HDL 46 (L) 01/08/2022     Lab Results   Component Value Date    LDLCALC 89 01/08/2022     No results found for: LABVLDL, VLDL  No results found for: CHOLHDLRATIO  Lab Results   Component Value Date    LABA1C 5.2 01/08/2022     No results found for: EAG  Lab Results   Component Value Date    TSHFT4 0.58 01/08/2022       Treatments: RN and SW    Mental status examination at the time of discharge:  Alert, Oriented X 4  Appearance:  Improved Hygiene, smiled  Speech with Regular Rate and Rhythm  Eye Contact:  Good  No Psychomotor Agitation/Retardation Noted  Attitude:  Cooperative  Mood:  \"Good.  Ready to go\"  Affective: Congruent, appropriate to the situation, with a normal range and intensity  Thought Processes:  Coherently communicated, logical and goal oriented  Thought Content:  No Suicidal Ideation, No Homicidal Ideation, No Auditory or Visual Hallucinations, NO Overt Delusions  Insight: Improved  Judgement: Improved  Memory is intact for both remote and recent  Intellectual Functioning:  Within the [address] of Knowledge:  Adequate  Attention and Concentration:  Adequate    Discharge Exam:  Please, see medical note    Disposition: home    Patient Instructions:   Current Discharge Medication List      START taking these medications    Details   citalopram (CELEXA) 20 MG tablet Take 1 tablet by mouth daily  Qty: 30 tablet, Refills: 1      risperiDONE (RISPERDAL) 2 MG tablet Take 1 tablet by mouth nightly  Qty: 30 tablet, Refills: 1      traZODone (DESYREL) 50 MG tablet Take 1 tablet by mouth nightly  Qty: 30 tablet, Refills: 1      vitamin D (ERGOCALCIFEROL) 1.25 MG (09358 UT) CAPS capsule Take 1 capsule by mouth once a week for 11 doses  Qty: 11 capsule, Refills: 1      vitamin B-12 (CYANOCOBALAMIN) 500 MCG tablet Take 1 tablet by mouth daily  Qty: 30 tablet, Refills: 1             Activity: as tolerated  Diet: regular diet  Wound Care: none needed    Follow-up:   Follow up with [address] [place] [address] [facility]   [address] [place] [address], [address]   Phone [phone]   Fax [phone]   CRISIS LINE: [phone]             Follow up with [address] [place] [address] [facility]   [address] [place] [address], [address]   Phone [phone]   Fax [phone]   CRISIS LINE: [phone]            Follow up with [facility],   [facility], [facility]   Phone: [phone]          Follow up with PCP in 1 day(s)  on follow up with PCP, please review labs/imaging done during this Hospital stay, and discuss any additional/repeat testing or treatment needed with your PCP          Time worked: 34 min    Participation: good    Electronically signed by Katia Angela MD on 1/12/2022 at 11:00 AM